# Patient Record
Sex: FEMALE | Race: BLACK OR AFRICAN AMERICAN | NOT HISPANIC OR LATINO
[De-identification: names, ages, dates, MRNs, and addresses within clinical notes are randomized per-mention and may not be internally consistent; named-entity substitution may affect disease eponyms.]

---

## 2017-01-27 ENCOUNTER — MEDICATION RENEWAL (OUTPATIENT)
Age: 37
End: 2017-01-27

## 2017-02-10 ENCOUNTER — MEDICATION RENEWAL (OUTPATIENT)
Age: 37
End: 2017-02-10

## 2017-02-15 ENCOUNTER — MEDICATION RENEWAL (OUTPATIENT)
Age: 37
End: 2017-02-15

## 2017-02-23 ENCOUNTER — APPOINTMENT (OUTPATIENT)
Dept: INTERNAL MEDICINE | Facility: CLINIC | Age: 37
End: 2017-02-23

## 2017-02-23 VITALS
DIASTOLIC BLOOD PRESSURE: 60 MMHG | SYSTOLIC BLOOD PRESSURE: 100 MMHG | WEIGHT: 148 LBS | BODY MASS INDEX: 24.66 KG/M2 | TEMPERATURE: 98.3 F | HEIGHT: 65 IN | HEART RATE: 76 BPM

## 2017-02-23 LAB — HBA1C MFR BLD HPLC: 6.3

## 2017-02-24 LAB
ALBUMIN SERPL ELPH-MCNC: 4.1 G/DL
ALP BLD-CCNC: 71 U/L
ALT SERPL-CCNC: 13 U/L
AST SERPL-CCNC: 15 U/L
BILIRUB DIRECT SERPL-MCNC: 0.1 MG/DL
BILIRUB INDIRECT SERPL-MCNC: 0.3 MG/DL
BILIRUB SERPL-MCNC: 0.5 MG/DL
CREAT SPEC-SCNC: 28 MG/DL
MICROALBUMIN 24H UR DL<=1MG/L-MCNC: <0.3 MG/DL
MICROALBUMIN/CREAT 24H UR-RTO: NORMAL UG/MG
PROT SERPL-MCNC: 7 G/DL

## 2017-05-22 ENCOUNTER — RX RENEWAL (OUTPATIENT)
Age: 37
End: 2017-05-22

## 2017-05-25 ENCOUNTER — APPOINTMENT (OUTPATIENT)
Dept: INTERNAL MEDICINE | Facility: CLINIC | Age: 37
End: 2017-05-25

## 2017-05-25 ENCOUNTER — MEDICATION RENEWAL (OUTPATIENT)
Age: 37
End: 2017-05-25

## 2017-05-25 VITALS
OXYGEN SATURATION: 98 % | HEIGHT: 65 IN | BODY MASS INDEX: 23.99 KG/M2 | WEIGHT: 144 LBS | TEMPERATURE: 98.1 F | DIASTOLIC BLOOD PRESSURE: 68 MMHG | SYSTOLIC BLOOD PRESSURE: 114 MMHG | HEART RATE: 74 BPM

## 2017-05-25 DIAGNOSIS — E55.9 VITAMIN D DEFICIENCY, UNSPECIFIED: ICD-10-CM

## 2017-05-25 LAB — HBA1C MFR BLD HPLC: 6.2

## 2017-05-26 LAB
ALBUMIN SERPL ELPH-MCNC: 4 G/DL
ALP BLD-CCNC: 53 U/L
ALT SERPL-CCNC: 12 U/L
AST SERPL-CCNC: 15 U/L
BILIRUB DIRECT SERPL-MCNC: 0.1 MG/DL
BILIRUB INDIRECT SERPL-MCNC: 0.4 MG/DL
BILIRUB SERPL-MCNC: 0.4 MG/DL
PROT SERPL-MCNC: 6.9 G/DL

## 2017-09-07 ENCOUNTER — APPOINTMENT (OUTPATIENT)
Dept: INTERNAL MEDICINE | Facility: CLINIC | Age: 37
End: 2017-09-07
Payer: COMMERCIAL

## 2017-09-07 VITALS
BODY MASS INDEX: 25.33 KG/M2 | WEIGHT: 152 LBS | HEIGHT: 65 IN | DIASTOLIC BLOOD PRESSURE: 77 MMHG | HEART RATE: 71 BPM | SYSTOLIC BLOOD PRESSURE: 115 MMHG | OXYGEN SATURATION: 98 % | TEMPERATURE: 98.5 F

## 2017-09-07 LAB — HBA1C MFR BLD HPLC: 6.9

## 2017-09-07 PROCEDURE — 99395 PREV VISIT EST AGE 18-39: CPT | Mod: 25

## 2017-09-07 PROCEDURE — 83036 HEMOGLOBIN GLYCOSYLATED A1C: CPT | Mod: QW

## 2017-09-07 PROCEDURE — 36415 COLL VENOUS BLD VENIPUNCTURE: CPT

## 2017-09-08 LAB
ALBUMIN SERPL ELPH-MCNC: 4.4 G/DL
ALP BLD-CCNC: 65 U/L
ALT SERPL-CCNC: 11 U/L
ANION GAP SERPL CALC-SCNC: 18 MMOL/L
APPEARANCE: CLEAR
AST SERPL-CCNC: 21 U/L
BASOPHILS # BLD AUTO: 0.02 K/UL
BASOPHILS NFR BLD AUTO: 0.2 %
BILIRUB SERPL-MCNC: 0.4 MG/DL
BILIRUBIN URINE: NEGATIVE
BLOOD URINE: NEGATIVE
BUN SERPL-MCNC: 10 MG/DL
CALCIUM SERPL-MCNC: 9.5 MG/DL
CHLORIDE SERPL-SCNC: 102 MMOL/L
CHOLEST SERPL-MCNC: 194 MG/DL
CHOLEST/HDLC SERPL: 2.7 RATIO
CO2 SERPL-SCNC: 21 MMOL/L
COLOR: YELLOW
CREAT SERPL-MCNC: 0.59 MG/DL
EOSINOPHIL # BLD AUTO: 0.06 K/UL
EOSINOPHIL NFR BLD AUTO: 0.7 %
GLUCOSE QUALITATIVE U: NORMAL MG/DL
GLUCOSE SERPL-MCNC: 89 MG/DL
HCT VFR BLD CALC: 43.2 %
HDLC SERPL-MCNC: 73 MG/DL
HGB BLD-MCNC: 14 G/DL
IMM GRANULOCYTES NFR BLD AUTO: 0.1 %
KETONES URINE: NEGATIVE
LDLC SERPL CALC-MCNC: 97 MG/DL
LEUKOCYTE ESTERASE URINE: NEGATIVE
LYMPHOCYTES # BLD AUTO: 4.49 K/UL
LYMPHOCYTES NFR BLD AUTO: 49.4 %
MAN DIFF?: NORMAL
MCHC RBC-ENTMCNC: 28.3 PG
MCHC RBC-ENTMCNC: 32.4 GM/DL
MCV RBC AUTO: 87.4 FL
MONOCYTES # BLD AUTO: 0.59 K/UL
MONOCYTES NFR BLD AUTO: 6.5 %
NEUTROPHILS # BLD AUTO: 3.91 K/UL
NEUTROPHILS NFR BLD AUTO: 43.1 %
NITRITE URINE: NEGATIVE
PH URINE: 5.5
PLATELET # BLD AUTO: 255 K/UL
POTASSIUM SERPL-SCNC: 4.5 MMOL/L
PROT SERPL-MCNC: 7.8 G/DL
PROTEIN URINE: NEGATIVE MG/DL
RBC # BLD: 4.94 M/UL
RBC # FLD: 13.4 %
SODIUM SERPL-SCNC: 141 MMOL/L
SPECIFIC GRAVITY URINE: 1.01
TRIGL SERPL-MCNC: 119 MG/DL
TSH SERPL-ACNC: 1.84 UIU/ML
UROBILINOGEN URINE: NORMAL MG/DL
VIT B12 SERPL-MCNC: 567 PG/ML
WBC # FLD AUTO: 9.08 K/UL

## 2017-09-09 LAB — 25(OH)D3 SERPL-MCNC: 32.8 NG/ML

## 2017-09-28 ENCOUNTER — APPOINTMENT (OUTPATIENT)
Dept: ULTRASOUND IMAGING | Facility: CLINIC | Age: 37
End: 2017-09-28
Payer: COMMERCIAL

## 2017-09-28 ENCOUNTER — OUTPATIENT (OUTPATIENT)
Dept: OUTPATIENT SERVICES | Facility: HOSPITAL | Age: 37
LOS: 1 days | End: 2017-09-28

## 2017-09-28 DIAGNOSIS — Z00.8 ENCOUNTER FOR OTHER GENERAL EXAMINATION: ICD-10-CM

## 2017-09-28 PROCEDURE — 76830 TRANSVAGINAL US NON-OB: CPT | Mod: 26

## 2017-09-28 PROCEDURE — 76856 US EXAM PELVIC COMPLETE: CPT | Mod: 26,59

## 2017-10-12 ENCOUNTER — MEDICATION RENEWAL (OUTPATIENT)
Age: 37
End: 2017-10-12

## 2017-10-13 LAB
HBV SURFACE AB SER QL: NONREACTIVE
HBV SURFACE AB SERPL IA-ACNC: <3 MIU/ML
HBV SURFACE AG SER QL: NONREACTIVE
MEV IGG FLD QL IA: 80.9 AU/ML
MEV IGG+IGM SER-IMP: POSITIVE
MUV AB SER-ACNC: POSITIVE
MUV IGG SER QL IA: 72.1 AU/ML
RUBV IGG FLD-ACNC: 25.7 INDEX
RUBV IGG SER-IMP: POSITIVE
VZV AB TITR SER: POSITIVE
VZV IGG SER IF-ACNC: 3188 INDEX

## 2017-10-16 LAB
ADJUSTED MITOGEN: >10 IU/ML
ADJUSTED TB AG: >10 IU/ML
AMPHETAMINES, SERUM: NEGATIVE
BARBITUATES, SERUM: NEGATIVE
BENZODIAZEPINES, SERUM: NEGATIVE
CANNABINOIDS, SERUM: NEGATIVE
COCAINE METABOLITES, SERUM: NEGATIVE
M TB IFN-G BLD-IMP: POSITIVE
METHADONE, SERUM: NEGATIVE
OPIATES, SERUM: NEGATIVE
PHENCYCLIDINE, SERUM: NEGATIVE
QUANTIFERON GOLD NIL: 0.3 IU/ML

## 2017-10-17 ENCOUNTER — APPOINTMENT (OUTPATIENT)
Dept: INTERNAL MEDICINE | Facility: CLINIC | Age: 37
End: 2017-10-17
Payer: COMMERCIAL

## 2017-10-17 ENCOUNTER — MED ADMIN CHARGE (OUTPATIENT)
Age: 37
End: 2017-10-17

## 2017-10-17 PROCEDURE — 90746 HEPB VACCINE 3 DOSE ADULT IM: CPT

## 2017-10-17 PROCEDURE — 90471 IMMUNIZATION ADMIN: CPT

## 2017-10-18 ENCOUNTER — RECORD ABSTRACTING (OUTPATIENT)
Age: 37
End: 2017-10-18

## 2017-10-19 ENCOUNTER — APPOINTMENT (OUTPATIENT)
Dept: INTERNAL MEDICINE | Facility: CLINIC | Age: 37
End: 2017-10-19
Payer: COMMERCIAL

## 2017-10-19 PROCEDURE — G0008: CPT

## 2017-10-19 PROCEDURE — 90688 IIV4 VACCINE SPLT 0.5 ML IM: CPT

## 2017-10-19 RX ORDER — LANCETS
EACH MISCELLANEOUS
Qty: 50 | Refills: 6 | Status: ACTIVE | COMMUNITY
Start: 2017-02-15 | End: 1900-01-01

## 2017-10-19 RX ORDER — DOXYCLYCLINE HYCLATE 150 MG/1
150 TABLET, COATED ORAL
Qty: 30 | Refills: 0 | Status: DISCONTINUED | COMMUNITY
Start: 2017-08-15

## 2017-10-19 RX ORDER — ADAPALENE AND BENZOYL PEROXIDE 3; 25 MG/G; MG/G
0.3-2.5 GEL TOPICAL
Qty: 45 | Refills: 0 | Status: DISCONTINUED | COMMUNITY
Start: 2017-08-15

## 2017-10-19 RX ORDER — SULFACETAMIDE SODIUM, SULFUR 100; 50 MG/G; MG/G
10-5 LIQUID TOPICAL
Qty: 227 | Refills: 0 | Status: DISCONTINUED | COMMUNITY
Start: 2017-08-15

## 2017-10-26 ENCOUNTER — APPOINTMENT (OUTPATIENT)
Dept: OBGYN | Facility: CLINIC | Age: 37
End: 2017-10-26
Payer: COMMERCIAL

## 2017-10-26 VITALS
DIASTOLIC BLOOD PRESSURE: 70 MMHG | BODY MASS INDEX: 24.25 KG/M2 | HEIGHT: 68 IN | SYSTOLIC BLOOD PRESSURE: 110 MMHG | WEIGHT: 160 LBS

## 2017-10-26 PROCEDURE — 99213 OFFICE O/P EST LOW 20 MIN: CPT

## 2017-10-31 ENCOUNTER — RECORD ABSTRACTING (OUTPATIENT)
Age: 37
End: 2017-10-31

## 2017-11-28 ENCOUNTER — RECORD ABSTRACTING (OUTPATIENT)
Age: 37
End: 2017-11-28

## 2017-11-28 DIAGNOSIS — Z92.89 PERSONAL HISTORY OF OTHER MEDICAL TREATMENT: ICD-10-CM

## 2017-11-29 ENCOUNTER — APPOINTMENT (OUTPATIENT)
Dept: OBGYN | Facility: CLINIC | Age: 37
End: 2017-11-29
Payer: COMMERCIAL

## 2017-11-29 VITALS
BODY MASS INDEX: 28.39 KG/M2 | HEIGHT: 65 IN | HEART RATE: 95 BPM | DIASTOLIC BLOOD PRESSURE: 75 MMHG | SYSTOLIC BLOOD PRESSURE: 112 MMHG | WEIGHT: 170.41 LBS

## 2017-11-29 PROCEDURE — 99212 OFFICE O/P EST SF 10 MIN: CPT

## 2017-12-27 ENCOUNTER — APPOINTMENT (OUTPATIENT)
Dept: INTERNAL MEDICINE | Facility: CLINIC | Age: 37
End: 2017-12-27
Payer: COMMERCIAL

## 2017-12-27 VITALS
TEMPERATURE: 98.2 F | WEIGHT: 168 LBS | HEIGHT: 65 IN | BODY MASS INDEX: 27.99 KG/M2 | DIASTOLIC BLOOD PRESSURE: 70 MMHG | HEART RATE: 86 BPM | SYSTOLIC BLOOD PRESSURE: 100 MMHG | OXYGEN SATURATION: 98 %

## 2017-12-27 LAB — HBA1C MFR BLD HPLC: 7

## 2017-12-27 PROCEDURE — 90746 HEPB VACCINE 3 DOSE ADULT IM: CPT

## 2017-12-27 PROCEDURE — 90471 IMMUNIZATION ADMIN: CPT

## 2017-12-27 PROCEDURE — 83036 HEMOGLOBIN GLYCOSYLATED A1C: CPT | Mod: QW

## 2017-12-27 PROCEDURE — 99214 OFFICE O/P EST MOD 30 MIN: CPT | Mod: 25

## 2017-12-27 RX ORDER — METFORMIN HYDROCHLORIDE 625 MG/1
TABLET ORAL
Refills: 0 | Status: DISCONTINUED | COMMUNITY
End: 2017-12-27

## 2018-04-17 ENCOUNTER — APPOINTMENT (OUTPATIENT)
Dept: INTERNAL MEDICINE | Facility: CLINIC | Age: 38
End: 2018-04-17

## 2018-04-30 ENCOUNTER — RX RENEWAL (OUTPATIENT)
Age: 38
End: 2018-04-30

## 2018-05-01 ENCOUNTER — APPOINTMENT (OUTPATIENT)
Dept: HUMAN REPRODUCTION | Facility: CLINIC | Age: 38
End: 2018-05-01
Payer: COMMERCIAL

## 2018-05-01 PROCEDURE — 36415 COLL VENOUS BLD VENIPUNCTURE: CPT

## 2018-05-01 PROCEDURE — 99243 OFF/OP CNSLTJ NEW/EST LOW 30: CPT | Mod: 25

## 2018-05-16 ENCOUNTER — APPOINTMENT (OUTPATIENT)
Dept: HUMAN REPRODUCTION | Facility: CLINIC | Age: 38
End: 2018-05-16
Payer: COMMERCIAL

## 2018-05-16 ENCOUNTER — TRANSCRIPTION ENCOUNTER (OUTPATIENT)
Age: 38
End: 2018-05-16

## 2018-05-16 PROCEDURE — 76830 TRANSVAGINAL US NON-OB: CPT

## 2018-05-16 PROCEDURE — 99213 OFFICE O/P EST LOW 20 MIN: CPT | Mod: 25

## 2018-05-16 PROCEDURE — 36415 COLL VENOUS BLD VENIPUNCTURE: CPT

## 2018-05-17 ENCOUNTER — APPOINTMENT (OUTPATIENT)
Dept: INTERNAL MEDICINE | Facility: CLINIC | Age: 38
End: 2018-05-17
Payer: COMMERCIAL

## 2018-05-17 VITALS
DIASTOLIC BLOOD PRESSURE: 70 MMHG | OXYGEN SATURATION: 98 % | TEMPERATURE: 97.4 F | SYSTOLIC BLOOD PRESSURE: 115 MMHG | HEART RATE: 87 BPM | HEIGHT: 65 IN | WEIGHT: 168 LBS | BODY MASS INDEX: 27.99 KG/M2

## 2018-05-17 LAB — HBA1C MFR BLD HPLC: 7.3

## 2018-05-17 PROCEDURE — 99214 OFFICE O/P EST MOD 30 MIN: CPT | Mod: 25

## 2018-05-17 PROCEDURE — 90471 IMMUNIZATION ADMIN: CPT

## 2018-05-17 PROCEDURE — 83036 HEMOGLOBIN GLYCOSYLATED A1C: CPT | Mod: QW

## 2018-05-17 PROCEDURE — 90746 HEPB VACCINE 3 DOSE ADULT IM: CPT

## 2018-05-17 RX ORDER — ADHESIVE TAPE 3"X 2.3 YD
50 MCG TAPE, NON-MEDICATED TOPICAL
Qty: 90 | Refills: 3 | Status: ACTIVE | COMMUNITY
Start: 2017-05-25 | End: 1900-01-01

## 2018-05-17 NOTE — HISTORY OF PRESENT ILLNESS
[FreeTextEntry1] : \par DM type 2 - last Aic 7.0 went up \par - taking metformin 500 BID  , exercising 2-3 days a weeks for 30 minutes, and has lost weight feels good wanted to know if she can cut her medication to once a day , now eating low carb diet ,no juices etc \par not checking sugar readings \par saw ophtho 9/2016 , will schedule apt \par \par h/o PPD + did chest xray 1981 \par -started 11/2016 INH and Pyridoxine , did CT chest results reviewed nl , will finish in July \par -Denies any symptoms of actinic keratosis. No night sweats, chronic cough, weight loss etc\par \par H/o GERD 2008 did EGD x 2 in [place] was given Rx for GASTRO- took omeprazole 40 for 1 month no symptoms now \par

## 2018-05-17 NOTE — PHYSICAL EXAM
[No Acute Distress] : no acute distress [Well Nourished] : well nourished [Well Developed] : well developed [No Respiratory Distress] : no respiratory distress  [Clear to Auscultation] : lungs were clear to auscultation bilaterally [No Accessory Muscle Use] : no accessory muscle use [Normal Rate] : normal rate  [Regular Rhythm] : with a regular rhythm [Normal S1, S2] : normal S1 and S2 [Soft] : abdomen soft [Non Tender] : non-tender [Normal Bowel Sounds] : normal bowel sounds

## 2018-05-17 NOTE — ASSESSMENT
[FreeTextEntry1] : 38 yo F with recently diagnosed DMII (A1C 11.1% 7/2016 ) presented today for follow up \par \par 1. DM \par - POC A1C today was 7.3 went up - due to non comp with medication  \par - advised to increase exercise to 30 minutes daily , do resistance training 2 x a week 20 minutes \par - refuse rice to 2 x a week , banaba to 2 x a week change potato to sweet potato  \par -no neuropathy , ophtho 9/2016 , will schedule f/u referral given \par --reenforced compliance with metformin change to to 500 po 2 x  daily after food \par - Reminded patient to maintain healthy eating habits (avoid sweets, carbs) and to continue exercising as she has been, including honey \par - Continue monitoring sugars\par - Will check ua , ophtho f/u  \par - F/u appointment in 3 months\par \par 2. Hyperlipidemia\par - self discontinued statins - refused medications \par - low fat diet reviewed \par \par 3. +PPD since 11/2016 rx \par - finished rx NH/Pyridoxine, check LFT \par \par 4. GERD\par - Patient states that it has resolved, will refill more if needed\par \par 5. Papular acne - seeing dermatologist on doxy , clinda / face wash has f/u 1 month \par received flu vaccine 8/2016\par \par non immune to hep B - received 2  dose so far  3rd dose given today \par \par HCM \par tdap - 2016\par PAP- 11 2016 \par got flu vaccine 10/2017

## 2018-05-18 LAB
CREAT SPEC-SCNC: 38 MG/DL
MICROALBUMIN 24H UR DL<=1MG/L-MCNC: 0.5 MG/DL
MICROALBUMIN/CREAT 24H UR-RTO: 13 MG/G

## 2018-07-12 ENCOUNTER — APPOINTMENT (OUTPATIENT)
Dept: OPHTHALMOLOGY | Facility: CLINIC | Age: 38
End: 2018-07-12
Payer: COMMERCIAL

## 2018-07-12 ENCOUNTER — APPOINTMENT (OUTPATIENT)
Dept: ENDOCRINOLOGY | Facility: CLINIC | Age: 38
End: 2018-07-12
Payer: COMMERCIAL

## 2018-07-12 VITALS
HEIGHT: 65 IN | WEIGHT: 171 LBS | OXYGEN SATURATION: 99 % | SYSTOLIC BLOOD PRESSURE: 110 MMHG | BODY MASS INDEX: 28.49 KG/M2 | DIASTOLIC BLOOD PRESSURE: 70 MMHG | HEART RATE: 76 BPM

## 2018-07-12 LAB
GLUCOSE BLDC GLUCOMTR-MCNC: 103
HBA1C MFR BLD HPLC: 6.9

## 2018-07-12 PROCEDURE — 83036 HEMOGLOBIN GLYCOSYLATED A1C: CPT | Mod: QW

## 2018-07-12 PROCEDURE — 92014 COMPRE OPH EXAM EST PT 1/>: CPT

## 2018-07-12 PROCEDURE — 99203 OFFICE O/P NEW LOW 30 MIN: CPT | Mod: 25

## 2018-07-12 PROCEDURE — 82962 GLUCOSE BLOOD TEST: CPT

## 2018-07-23 ENCOUNTER — RESULT REVIEW (OUTPATIENT)
Age: 38
End: 2018-07-23

## 2018-07-23 LAB
17OHP SERPL-MCNC: 126 NG/DL
25(OH)D3 SERPL-MCNC: 38.1 NG/ML
CHOLEST SERPL-MCNC: 183 MG/DL
CHOLEST/HDLC SERPL: 3.2 RATIO
DHEA-S SERPL-MCNC: 54.4 UG/DL
ESTRADIOL SERPL-MCNC: 113 PG/ML
FSH SERPL-MCNC: 8.4 IU/L
HBA1C MFR BLD HPLC: 6.8 %
HDLC SERPL-MCNC: 57 MG/DL
INSULIN P FAST SERPL-ACNC: 9.4 UU/ML
LDLC SERPL CALC-MCNC: 101 MG/DL
LH SERPL-ACNC: 16.1 IU/L
PROLACTIN SERPL-MCNC: 17 NG/ML
SHBG SERPL-SCNC: 34 NMOL/L
T4 FREE SERPL-MCNC: 1.3 NG/DL
TESTOST BND SERPL-MCNC: 0.46 NG/DL
TESTOST SERPL-MCNC: 14.1 NG/DL
TESTOSTERONE BIOAVAILABLE: 2.2 NG/DL
TESTOSTERONE TOTAL S: 17 NG/DL
THYROGLOB AB SERPL-ACNC: <20 IU/ML
THYROPEROXIDASE AB SERPL IA-ACNC: <10 IU/ML
TRIGL SERPL-MCNC: 125 MG/DL
TSH SERPL-ACNC: 1.79 UIU/ML

## 2018-07-24 LAB
MONOMERIC PROLACTIN (ICMA)*: 14 NG/ML
PERCENT MACROPROLACTIN: 0 %
PROLACTIN, SERUM (ICMA)*: 14 NG/ML

## 2018-08-23 ENCOUNTER — RX RENEWAL (OUTPATIENT)
Age: 38
End: 2018-08-23

## 2018-08-24 ENCOUNTER — APPOINTMENT (OUTPATIENT)
Dept: HUMAN REPRODUCTION | Facility: CLINIC | Age: 38
End: 2018-08-24
Payer: COMMERCIAL

## 2018-08-24 PROCEDURE — 36415 COLL VENOUS BLD VENIPUNCTURE: CPT

## 2018-08-24 PROCEDURE — 99213 OFFICE O/P EST LOW 20 MIN: CPT | Mod: 25

## 2018-08-24 PROCEDURE — 76830 TRANSVAGINAL US NON-OB: CPT

## 2018-08-30 ENCOUNTER — APPOINTMENT (OUTPATIENT)
Dept: HUMAN REPRODUCTION | Facility: CLINIC | Age: 38
End: 2018-08-30
Payer: COMMERCIAL

## 2018-08-30 PROCEDURE — 99213 OFFICE O/P EST LOW 20 MIN: CPT | Mod: 25

## 2018-08-30 PROCEDURE — 76830 TRANSVAGINAL US NON-OB: CPT

## 2018-09-01 ENCOUNTER — APPOINTMENT (OUTPATIENT)
Dept: HUMAN REPRODUCTION | Facility: CLINIC | Age: 38
End: 2018-09-01
Payer: COMMERCIAL

## 2018-09-01 PROCEDURE — 99213 OFFICE O/P EST LOW 20 MIN: CPT | Mod: 25

## 2018-09-01 PROCEDURE — 89261 SPERM ISOLATION COMPLEX: CPT

## 2018-09-01 PROCEDURE — 58322 ARTIFICIAL INSEMINATION: CPT

## 2018-09-01 PROCEDURE — 76830 TRANSVAGINAL US NON-OB: CPT

## 2018-09-04 ENCOUNTER — FORM ENCOUNTER (OUTPATIENT)
Age: 38
End: 2018-09-04

## 2018-09-13 ENCOUNTER — APPOINTMENT (OUTPATIENT)
Dept: INTERNAL MEDICINE | Facility: CLINIC | Age: 38
End: 2018-09-13
Payer: COMMERCIAL

## 2018-09-13 VITALS
OXYGEN SATURATION: 98 % | DIASTOLIC BLOOD PRESSURE: 78 MMHG | TEMPERATURE: 98 F | HEIGHT: 65 IN | WEIGHT: 150 LBS | HEART RATE: 96 BPM | BODY MASS INDEX: 24.99 KG/M2 | SYSTOLIC BLOOD PRESSURE: 110 MMHG

## 2018-09-13 PROCEDURE — G0008: CPT

## 2018-09-13 PROCEDURE — 90686 IIV4 VACC NO PRSV 0.5 ML IM: CPT

## 2018-09-13 PROCEDURE — 36415 COLL VENOUS BLD VENIPUNCTURE: CPT

## 2018-09-13 PROCEDURE — 83036 HEMOGLOBIN GLYCOSYLATED A1C: CPT | Mod: QW

## 2018-09-13 PROCEDURE — 99214 OFFICE O/P EST MOD 30 MIN: CPT | Mod: 25

## 2018-09-13 NOTE — HISTORY OF PRESENT ILLNESS
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 \par \par DM type 2 - saw endo AIc 7/2018 was 6.8 went up on metformin 500 TID  \par - lost 20 lbs \par - exercising 2-3 days a weeks for 30 minutes, and has lost weight feels good wanted to know if she can cut her medication to once a day , now eating low carb diet ,no juices etc \par not checking sugar readings \par saw ophtho 7/2018 all ok no retinopathy  \par \par also seeing fertility doctor to get IVF -rxs \par \par h/o PPD + did chest xray 1981 \par -started 11/2016 INH and Pyridoxine , did CT chest results reviewed nl , will finish in July \par -Denies any symptoms of actinic keratosis. No night sweats, chronic cough, weight loss etc\par \par H/o GERD 2008 did EGD x 2 in [place] was given Rx for GASTRO- took omeprazole 40 for 1 month no symptoms now \par

## 2018-09-13 NOTE — ASSESSMENT
[FreeTextEntry1] : 38 yo F with recently diagnosed DMII (A1C 11.1% 7/2016 ) presented today for follow up \par \par 1. DM \par - POC A1C today was- 4.4 will get venous draw to confirm \par - advised to increase exercise to 30 minutes daily , do resistance training 2 x a week 20 minutes \par -no neuropathy , ophtho 7/2018  \par --reenforced compliance with metformin change to to 500 po 2 x  daily after food if true value \par - Reminded patient to maintain healthy eating habits (avoid sweets, carbs) and to continue exercising as she has been, including honey \par - Continue monitoring sugars\par - F/u appointment in 3 months\par \par 2. Hyperlipidemia\par - self discontinued statins - refused medications \par - low fat diet reviewed \par \par 3. +PPD since 11/2016 rx \par - finished rx [facility]/Pyridoxine, check LFT \par \par 4. GERD\par - Patient states that it has resolved, will refill more if needed\par \par 5. Papular acne - seeing dermatologist on doxy , clinda / face wash has f/u 1 month \par received flu vaccine 8/2016\par \par forms filled - urine tox ordered \par \par HCM \par tdap - 2016\par PAP- 2018 as per pt \par flu vaccine given today

## 2018-09-14 ENCOUNTER — APPOINTMENT (OUTPATIENT)
Dept: HUMAN REPRODUCTION | Facility: CLINIC | Age: 38
End: 2018-09-14
Payer: COMMERCIAL

## 2018-09-14 LAB
CREAT SPEC-SCNC: 171 MG/DL
HBA1C MFR BLD HPLC: 4.4
HBA1C MFR BLD HPLC: 6.3 %
HBV SURFACE AB SER QL: REACTIVE
HBV SURFACE AB SERPL IA-ACNC: >1000 MIU/ML
MICROALBUMIN 24H UR DL<=1MG/L-MCNC: <1.2 MG/DL
MICROALBUMIN/CREAT 24H UR-RTO: NORMAL

## 2018-09-14 PROCEDURE — 99213 OFFICE O/P EST LOW 20 MIN: CPT

## 2018-09-14 PROCEDURE — 36415 COLL VENOUS BLD VENIPUNCTURE: CPT

## 2018-09-17 LAB — DRUG ABUSE PANEL-9, SERUM: NORMAL

## 2018-09-18 ENCOUNTER — APPOINTMENT (OUTPATIENT)
Dept: HUMAN REPRODUCTION | Facility: CLINIC | Age: 38
End: 2018-09-18
Payer: COMMERCIAL

## 2018-09-18 PROCEDURE — 99213 OFFICE O/P EST LOW 20 MIN: CPT | Mod: 25

## 2018-09-18 PROCEDURE — 36415 COLL VENOUS BLD VENIPUNCTURE: CPT

## 2018-09-18 PROCEDURE — 76830 TRANSVAGINAL US NON-OB: CPT

## 2018-09-25 ENCOUNTER — APPOINTMENT (OUTPATIENT)
Dept: HUMAN REPRODUCTION | Facility: CLINIC | Age: 38
End: 2018-09-25
Payer: COMMERCIAL

## 2018-09-25 ENCOUNTER — APPOINTMENT (OUTPATIENT)
Dept: HUMAN REPRODUCTION | Facility: CLINIC | Age: 38
End: 2018-09-25

## 2018-09-25 PROCEDURE — 89261 SPERM ISOLATION COMPLEX: CPT

## 2018-09-25 PROCEDURE — 99213 OFFICE O/P EST LOW 20 MIN: CPT | Mod: 25

## 2018-09-25 PROCEDURE — 58322 ARTIFICIAL INSEMINATION: CPT

## 2018-10-09 ENCOUNTER — APPOINTMENT (OUTPATIENT)
Dept: HUMAN REPRODUCTION | Facility: CLINIC | Age: 38
End: 2018-10-09
Payer: COMMERCIAL

## 2018-10-09 PROCEDURE — 36415 COLL VENOUS BLD VENIPUNCTURE: CPT

## 2018-10-09 PROCEDURE — 99211 OFF/OP EST MAY X REQ PHY/QHP: CPT | Mod: 25

## 2018-10-12 ENCOUNTER — APPOINTMENT (OUTPATIENT)
Dept: HUMAN REPRODUCTION | Facility: CLINIC | Age: 38
End: 2018-10-12
Payer: COMMERCIAL

## 2018-10-12 PROCEDURE — 76830 TRANSVAGINAL US NON-OB: CPT

## 2018-10-12 PROCEDURE — 99213 OFFICE O/P EST LOW 20 MIN: CPT | Mod: 25

## 2018-10-12 PROCEDURE — 36415 COLL VENOUS BLD VENIPUNCTURE: CPT

## 2018-10-19 ENCOUNTER — APPOINTMENT (OUTPATIENT)
Dept: HUMAN REPRODUCTION | Facility: CLINIC | Age: 38
End: 2018-10-19
Payer: COMMERCIAL

## 2018-10-19 PROCEDURE — 76830 TRANSVAGINAL US NON-OB: CPT

## 2018-10-19 PROCEDURE — 99213 OFFICE O/P EST LOW 20 MIN: CPT | Mod: 25

## 2018-10-19 PROCEDURE — 36415 COLL VENOUS BLD VENIPUNCTURE: CPT

## 2018-10-21 ENCOUNTER — APPOINTMENT (OUTPATIENT)
Dept: HUMAN REPRODUCTION | Facility: CLINIC | Age: 38
End: 2018-10-21
Payer: COMMERCIAL

## 2018-10-21 PROCEDURE — 99213 OFFICE O/P EST LOW 20 MIN: CPT | Mod: 25

## 2018-10-21 PROCEDURE — 58322 ARTIFICIAL INSEMINATION: CPT

## 2018-10-21 PROCEDURE — 89261 SPERM ISOLATION COMPLEX: CPT

## 2018-10-21 PROCEDURE — 76830 TRANSVAGINAL US NON-OB: CPT

## 2018-10-23 ENCOUNTER — MEDICATION RENEWAL (OUTPATIENT)
Age: 38
End: 2018-10-23

## 2018-10-29 ENCOUNTER — RX RENEWAL (OUTPATIENT)
Age: 38
End: 2018-10-29

## 2018-11-06 ENCOUNTER — APPOINTMENT (OUTPATIENT)
Dept: HUMAN REPRODUCTION | Facility: CLINIC | Age: 38
End: 2018-11-06
Payer: COMMERCIAL

## 2018-11-06 PROCEDURE — 99213 OFFICE O/P EST LOW 20 MIN: CPT | Mod: 25

## 2018-11-06 PROCEDURE — 76830 TRANSVAGINAL US NON-OB: CPT

## 2018-11-06 PROCEDURE — 36415 COLL VENOUS BLD VENIPUNCTURE: CPT

## 2018-11-14 ENCOUNTER — APPOINTMENT (OUTPATIENT)
Dept: HUMAN REPRODUCTION | Facility: CLINIC | Age: 38
End: 2018-11-14
Payer: COMMERCIAL

## 2018-11-14 PROCEDURE — 99213 OFFICE O/P EST LOW 20 MIN: CPT | Mod: 25

## 2018-11-14 PROCEDURE — 76830 TRANSVAGINAL US NON-OB: CPT

## 2018-11-16 ENCOUNTER — APPOINTMENT (OUTPATIENT)
Dept: HUMAN REPRODUCTION | Facility: CLINIC | Age: 38
End: 2018-11-16
Payer: COMMERCIAL

## 2018-11-16 PROCEDURE — 89261 SPERM ISOLATION COMPLEX: CPT

## 2018-11-16 PROCEDURE — 58323 SPERM WASHING: CPT

## 2018-11-16 PROCEDURE — 99213 OFFICE O/P EST LOW 20 MIN: CPT | Mod: 25

## 2018-11-16 PROCEDURE — 58322 ARTIFICIAL INSEMINATION: CPT

## 2018-11-19 ENCOUNTER — MEDICATION RENEWAL (OUTPATIENT)
Age: 38
End: 2018-11-19

## 2018-11-30 ENCOUNTER — APPOINTMENT (OUTPATIENT)
Dept: HUMAN REPRODUCTION | Facility: CLINIC | Age: 38
End: 2018-11-30

## 2018-12-05 ENCOUNTER — APPOINTMENT (OUTPATIENT)
Dept: HUMAN REPRODUCTION | Facility: CLINIC | Age: 38
End: 2018-12-05
Payer: COMMERCIAL

## 2018-12-05 PROCEDURE — 36415 COLL VENOUS BLD VENIPUNCTURE: CPT

## 2018-12-05 PROCEDURE — 99213 OFFICE O/P EST LOW 20 MIN: CPT | Mod: 25

## 2018-12-05 PROCEDURE — 76830 TRANSVAGINAL US NON-OB: CPT

## 2018-12-11 ENCOUNTER — APPOINTMENT (OUTPATIENT)
Dept: HUMAN REPRODUCTION | Facility: CLINIC | Age: 38
End: 2018-12-11
Payer: COMMERCIAL

## 2018-12-11 PROCEDURE — 36415 COLL VENOUS BLD VENIPUNCTURE: CPT

## 2018-12-11 PROCEDURE — 76830 TRANSVAGINAL US NON-OB: CPT

## 2018-12-11 PROCEDURE — 99213 OFFICE O/P EST LOW 20 MIN: CPT | Mod: 25

## 2018-12-14 ENCOUNTER — APPOINTMENT (OUTPATIENT)
Dept: HUMAN REPRODUCTION | Facility: CLINIC | Age: 38
End: 2018-12-14

## 2018-12-16 ENCOUNTER — APPOINTMENT (OUTPATIENT)
Dept: HUMAN REPRODUCTION | Facility: CLINIC | Age: 38
End: 2018-12-16
Payer: COMMERCIAL

## 2018-12-16 PROCEDURE — 99213 OFFICE O/P EST LOW 20 MIN: CPT | Mod: 25

## 2018-12-16 PROCEDURE — 76830 TRANSVAGINAL US NON-OB: CPT

## 2018-12-16 PROCEDURE — 36415 COLL VENOUS BLD VENIPUNCTURE: CPT

## 2018-12-28 ENCOUNTER — MEDICATION RENEWAL (OUTPATIENT)
Age: 38
End: 2018-12-28

## 2019-01-16 ENCOUNTER — APPOINTMENT (OUTPATIENT)
Dept: HUMAN REPRODUCTION | Facility: CLINIC | Age: 39
End: 2019-01-16
Payer: COMMERCIAL

## 2019-01-16 PROCEDURE — 99215 OFFICE O/P EST HI 40 MIN: CPT

## 2019-02-14 ENCOUNTER — NON-APPOINTMENT (OUTPATIENT)
Age: 39
End: 2019-02-14

## 2019-02-14 ENCOUNTER — APPOINTMENT (OUTPATIENT)
Dept: INTERNAL MEDICINE | Facility: CLINIC | Age: 39
End: 2019-02-14
Payer: COMMERCIAL

## 2019-02-14 VITALS
TEMPERATURE: 98.5 F | BODY MASS INDEX: 26.99 KG/M2 | OXYGEN SATURATION: 98 % | WEIGHT: 162 LBS | DIASTOLIC BLOOD PRESSURE: 68 MMHG | HEIGHT: 65 IN | HEART RATE: 91 BPM | SYSTOLIC BLOOD PRESSURE: 126 MMHG

## 2019-02-14 PROCEDURE — 93000 ELECTROCARDIOGRAM COMPLETE: CPT

## 2019-02-14 PROCEDURE — 83036 HEMOGLOBIN GLYCOSYLATED A1C: CPT | Mod: QW

## 2019-02-14 PROCEDURE — 99214 OFFICE O/P EST MOD 30 MIN: CPT | Mod: 25

## 2019-02-14 PROCEDURE — 99395 PREV VISIT EST AGE 18-39: CPT | Mod: 25

## 2019-02-14 NOTE — HEALTH RISK ASSESSMENT
[Good] : ~his/her~  mood as  good [No falls in past year] : Patient reported no falls in the past year Awake [0] : 2) Feeling down, depressed, or hopeless: Not at all (0) [Patient reported PAP Smear was normal] : Patient reported PAP Smear was normal [None] : None [With Significant Other] : lives with significant other [Employed] : employed [] :  [Fully functional (bathing, dressing, toileting, transferring, walking, feeding)] : Fully functional (bathing, dressing, toileting, transferring, walking, feeding) [Fully functional (using the telephone, shopping, preparing meals, housekeeping, doing laundry, using] : Fully functional and needs no help or supervision to perform IADLs (using the telephone, shopping, preparing meals, housekeeping, doing laundry, using transportation, managing medications and managing finances) [] : No [[id number]] : 0 [Reports changes in hearing] : Reports no changes in hearing [Reports changes in vision] : Reports no changes in vision [Reports changes in dental health] : Reports no changes in dental health [PapSmearDate] : 2018  [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

## 2019-02-14 NOTE — HISTORY OF PRESENT ILLNESS
[FreeTextEntry1] : \par now working as a  HHA \par \par c/o internittent palpitation , no CP , SOB or dizzy spells, started 2 weeks ago \par \par DM type 2 - saw endo AIc 7/2018 on metformin 500 1 am amd 2 pm \par - lost 20 lbs in 9/2018 now back 12 lbs \par - exercising 2-3 days a weeks for 30 minutes, and has lost weight feels good wanted to know if she can cut her medication to once a day , now eating low carb diet ,no juices etc \par not checking sugar readings \par saw ophtho 7/2018 all ok no retinopathy  \par \par also seeing fertility doctor to get IVF -rxs \par \par h/o PPD + did chest xray 1981 \par -started 11/2016 INH and Pyridoxine , did CT chest results reviewed nl , will finish in July \par -Denies any symptoms of actinic keratosis. No night sweats, chronic cough, weight loss etc\par \par H/o GERD 2008 did EGD x 2 in [place] was given Rx for GASTRO- took omeprazole 40 for 1 month no symptoms now \par

## 2019-02-14 NOTE — ASSESSMENT
[FreeTextEntry1] : 37 yo F with recently diagnosed DMII (A1C 11.1% 7/2016 ) presented today for Annual check up \par \par intermittent palpitations - \par EKG - NSR no acute st changes \par -referred to see cardio r/o ACS \par -GEt TSH CMP \par -get TSH , CBC, CMP , Mg \par - educated to cut down on caffeine , tea , soda , chocolates etc \par -educated pt if palpitation reoccurs or Cp , left shoulder pain to go to ER or Call 911\par \par  DM \par - POC A1C today was- 6.4\par - advised to increase exercise to 30 minutes daily , do resistance training 2 x a week 20 minutes \par -no neuropathy , ophtho 7/2018  \par --reenforced compliance with metformin change to to 500 po 2 x  daily after food if true value \par - Reminded patient to maintain healthy eating habits (avoid sweets, carbs) and to continue exercising as she has been, including honey \par - Continue monitoring sugars\par - F/u appointment in 3 months\par \par  Hyperlipidemia\par - self discontinued statins - refused medications \par - low fat diet reviewed \par \par h/o +PPD since 11/2016 rx \par - finished rx NH/Pyridoxine, check LFT \par \par  GERD\par - Patient states that it has resolved, will refill more if needed\par \par Papular acne - seeing dermatologist on doxy , clinda / face wash \par \par \par HCM \par tdap - 2016\par PAP- 2018 as per pt \par flu vaccine given 9/2018

## 2019-02-19 LAB
25(OH)D3 SERPL-MCNC: 32.8 NG/ML
ALBUMIN SERPL ELPH-MCNC: 4.3 G/DL
ALP BLD-CCNC: 51 U/L
ALT SERPL-CCNC: 22 U/L
ANION GAP SERPL CALC-SCNC: 12 MMOL/L
APPEARANCE: CLEAR
AST SERPL-CCNC: 22 U/L
BASOPHILS # BLD AUTO: 0.02 K/UL
BASOPHILS NFR BLD AUTO: 0.2 %
BILIRUB SERPL-MCNC: 0.4 MG/DL
BILIRUBIN URINE: NEGATIVE
BLOOD URINE: NEGATIVE
BUN SERPL-MCNC: 9 MG/DL
CALCIUM SERPL-MCNC: 9.5 MG/DL
CHLORIDE SERPL-SCNC: 104 MMOL/L
CHOLEST SERPL-MCNC: 178 MG/DL
CHOLEST/HDLC SERPL: 3.4 RATIO
CO2 SERPL-SCNC: 24 MMOL/L
COLOR: YELLOW
CREAT SERPL-MCNC: 0.69 MG/DL
EOSINOPHIL # BLD AUTO: 0.07 K/UL
EOSINOPHIL NFR BLD AUTO: 0.8 %
GLUCOSE QUALITATIVE U: NEGATIVE MG/DL
GLUCOSE SERPL-MCNC: 95 MG/DL
HBA1C MFR BLD HPLC: 6.6 %
HCT VFR BLD CALC: 41.3 %
HDLC SERPL-MCNC: 53 MG/DL
HGB BLD-MCNC: 13.1 G/DL
IMM GRANULOCYTES NFR BLD AUTO: 0.3 %
KETONES URINE: ABNORMAL
LDLC SERPL CALC-MCNC: 107 MG/DL
LEUKOCYTE ESTERASE URINE: NEGATIVE
LYMPHOCYTES # BLD AUTO: 3.42 K/UL
LYMPHOCYTES NFR BLD AUTO: 38.6 %
MAN DIFF?: NORMAL
MCHC RBC-ENTMCNC: 28.4 PG
MCHC RBC-ENTMCNC: 31.7 GM/DL
MCV RBC AUTO: 89.6 FL
MONOCYTES # BLD AUTO: 0.65 K/UL
MONOCYTES NFR BLD AUTO: 7.3 %
NEUTROPHILS # BLD AUTO: 4.68 K/UL
NEUTROPHILS NFR BLD AUTO: 52.8 %
NITRITE URINE: NEGATIVE
PH URINE: 5
PLATELET # BLD AUTO: 358 K/UL
POTASSIUM SERPL-SCNC: 3.8 MMOL/L
PROT SERPL-MCNC: 7 G/DL
PROTEIN URINE: NEGATIVE MG/DL
RBC # BLD: 4.61 M/UL
RBC # FLD: 14.2 %
SODIUM SERPL-SCNC: 140 MMOL/L
SPECIFIC GRAVITY URINE: 1.02
TRIGL SERPL-MCNC: 88 MG/DL
TSH SERPL-ACNC: 1.52 UIU/ML
UROBILINOGEN URINE: NEGATIVE MG/DL
VIT B12 SERPL-MCNC: 362 PG/ML
WBC # FLD AUTO: 8.87 K/UL

## 2019-02-26 ENCOUNTER — MEDICATION RENEWAL (OUTPATIENT)
Age: 39
End: 2019-02-26

## 2019-04-24 ENCOUNTER — APPOINTMENT (OUTPATIENT)
Dept: INTERNAL MEDICINE | Facility: CLINIC | Age: 39
End: 2019-04-24

## 2019-05-07 ENCOUNTER — APPOINTMENT (OUTPATIENT)
Dept: HUMAN REPRODUCTION | Facility: CLINIC | Age: 39
End: 2019-05-07
Payer: COMMERCIAL

## 2019-05-07 PROCEDURE — 99213 OFFICE O/P EST LOW 20 MIN: CPT | Mod: 25

## 2019-05-07 PROCEDURE — 76830 TRANSVAGINAL US NON-OB: CPT

## 2019-05-07 PROCEDURE — 36415 COLL VENOUS BLD VENIPUNCTURE: CPT

## 2019-05-09 ENCOUNTER — NON-APPOINTMENT (OUTPATIENT)
Age: 39
End: 2019-05-09

## 2019-05-09 ENCOUNTER — APPOINTMENT (OUTPATIENT)
Dept: CARDIOLOGY | Facility: CLINIC | Age: 39
End: 2019-05-09
Payer: COMMERCIAL

## 2019-05-09 VITALS
BODY MASS INDEX: 26.99 KG/M2 | SYSTOLIC BLOOD PRESSURE: 120 MMHG | HEIGHT: 65 IN | HEART RATE: 84 BPM | WEIGHT: 162 LBS | DIASTOLIC BLOOD PRESSURE: 70 MMHG | OXYGEN SATURATION: 98 %

## 2019-05-09 PROCEDURE — 93000 ELECTROCARDIOGRAM COMPLETE: CPT

## 2019-05-09 PROCEDURE — 99203 OFFICE O/P NEW LOW 30 MIN: CPT

## 2019-05-09 NOTE — PHYSICAL EXAM
[General Appearance - Well Developed] : well developed [Normal Appearance] : normal appearance [Well Groomed] : well groomed [No Deformities] : no deformities [General Appearance - Well Nourished] : well nourished [No Oral Pallor] : no oral pallor [General Appearance - In No Acute Distress] : no acute distress [Normal Oral Mucosa] : normal oral mucosa [No Oral Cyanosis] : no oral cyanosis [Normal Jugular Venous V Waves Present] : normal jugular venous V waves present [Normal Jugular Venous A Waves Present] : normal jugular venous A waves present [Heart Rate And Rhythm] : heart rate and rhythm were normal [No Jugular Venous Tavares A Waves] : no jugular venous tavares A waves [Heart Sounds] : normal S1 and S2 [Murmurs] : no murmurs present [Respiration, Rhythm And Depth] : normal respiratory rhythm and effort [Exaggerated Use Of Accessory Muscles For Inspiration] : no accessory muscle use [Auscultation Breath Sounds / Voice Sounds] : lungs were clear to auscultation bilaterally [Abdomen Tenderness] : non-tender [] : no hepato-splenomegaly [Abdomen Soft] : soft [Abnormal Walk] : normal gait [Abdomen Mass (___ Cm)] : no abdominal mass palpated [Gait - Sufficient For Exercise Testing] : the gait was sufficient for exercise testing

## 2019-05-09 NOTE — HISTORY OF PRESENT ILLNESS
[FreeTextEntry1] : 38 year old woman with history of DM who had 1 episode palpitations after drinking 2-3 cups of coffee that day. No chest pain, dyspnea.\par Stopped coffee, no palpitations.

## 2019-05-09 NOTE — DISCUSSION/SUMMARY
[FreeTextEntry1] : 38 year old woman with palpitations\par Resolved after stopping caffeine\par Will monitor\par FU prn\par

## 2019-05-10 ENCOUNTER — APPOINTMENT (OUTPATIENT)
Dept: ENDOCRINOLOGY | Facility: CLINIC | Age: 39
End: 2019-05-10
Payer: COMMERCIAL

## 2019-05-10 VITALS
BODY MASS INDEX: 28.32 KG/M2 | DIASTOLIC BLOOD PRESSURE: 70 MMHG | SYSTOLIC BLOOD PRESSURE: 110 MMHG | WEIGHT: 170 LBS | HEART RATE: 84 BPM | HEIGHT: 65 IN | OXYGEN SATURATION: 98 %

## 2019-05-10 LAB
GLUCOSE BLDC GLUCOMTR-MCNC: 159
HBA1C MFR BLD HPLC: 6.9

## 2019-05-10 PROCEDURE — 83036 HEMOGLOBIN GLYCOSYLATED A1C: CPT | Mod: QW

## 2019-05-10 PROCEDURE — 99214 OFFICE O/P EST MOD 30 MIN: CPT | Mod: 25

## 2019-05-10 PROCEDURE — 36415 COLL VENOUS BLD VENIPUNCTURE: CPT

## 2019-05-10 PROCEDURE — 82962 GLUCOSE BLOOD TEST: CPT

## 2019-05-13 NOTE — ASSESSMENT
[FreeTextEntry1] : 39 yo F with PMH infertility, PCOS, DM2, HLD latent TB post INH, currently attempting pregnancy\par \par 1. DM2 - Will increase metformin to 500 mg 2 tabs in the AM and 2 tabs in the PM. Refer to nutritionist. Will mail in glucose log in 2 weeks. Will not start statin due to desired pregnancy \par \par 2. Infertility/PCOS -  Increase metformin as above. No PCOM on US. post clomid in December. Attempting IVF.\par \par \par

## 2019-05-13 NOTE — HISTORY OF PRESENT ILLNESS
[FreeTextEntry1] : 39 yo F with PMH infertility, PCOS, DM2, HLD latent TB post INH\par \par DM2\par Dx 7/2016\par Denies neuropathy\par on metformin 500 1 tab q am and 2 tabs q pm \par ophthalmologist visit 7/12/18\par Has never seen a podiatrist\par B 2 pieces of WW bread + egg// oatmeal and 1/2 banana 1 cup milk\par L: 1/4 rice + veg + meat\par D: bread + oatmeal \par F: 110-155\par >> \par \par Infertility\par She has been attempting pregnancy for > 2 years\par at initial evaluation she reports cycles had been q 26-29 days\par admits to acne\par denies hirsutism. denies excessive hair loss\par pelvic US 8/25/16 did not reveal PCOM\par normal hysterosalpingogram 2017\par Pending IVF\par menarche at 12 years old\par GYN: Bennett Woody

## 2019-05-16 ENCOUNTER — APPOINTMENT (OUTPATIENT)
Dept: INTERNAL MEDICINE | Facility: CLINIC | Age: 39
End: 2019-05-16
Payer: COMMERCIAL

## 2019-05-16 ENCOUNTER — APPOINTMENT (OUTPATIENT)
Dept: HUMAN REPRODUCTION | Facility: CLINIC | Age: 39
End: 2019-05-16
Payer: COMMERCIAL

## 2019-05-16 VITALS
BODY MASS INDEX: 27.99 KG/M2 | SYSTOLIC BLOOD PRESSURE: 122 MMHG | DIASTOLIC BLOOD PRESSURE: 60 MMHG | WEIGHT: 168 LBS | HEART RATE: 73 BPM | TEMPERATURE: 98 F | OXYGEN SATURATION: 98 % | HEIGHT: 65 IN

## 2019-05-16 PROCEDURE — 99213 OFFICE O/P EST LOW 20 MIN: CPT | Mod: 25

## 2019-05-16 PROCEDURE — 99214 OFFICE O/P EST MOD 30 MIN: CPT | Mod: 25

## 2019-05-16 PROCEDURE — 58340 CATHETER FOR HYSTEROGRAPHY: CPT

## 2019-05-16 PROCEDURE — 36415 COLL VENOUS BLD VENIPUNCTURE: CPT

## 2019-05-16 PROCEDURE — 76831 ECHO EXAM UTERUS: CPT

## 2019-05-16 NOTE — HISTORY OF PRESENT ILLNESS
[FreeTextEntry1] : \par now working as a  HHA \par \par needs drug screening \par strips filled \par \par DM type 2 - saw endo AIc 6.9 5/10/19 \par - on metformin 500 2 am amd 2 pm \par - lost 20 lbs in 9/2018 now back 12 lbs \par - exercising 2-3 days a weeks for 30 minutes, and has lost weight feels good wanted to know if she can cut her medication to once a day , now eating low carb diet ,no juices etc \par not checking sugar readings \par saw ophtho 7/2018 all ok no retinopathy  \par \par also seeing fertility doctor to get IVF -rxs \par \par h/o PPD + did chest xray 1981 \par -started 11/2016 INH and Pyridoxine , did CT chest results reviewed nl , will finish in July \par -Denies any symptoms of actinic keratosis. No night sweats, chronic cough, weight loss etc\par \par H/o GERD 2008 did EGD x 2 in [place] was given Rx for GASTRO- took omeprazole 40 for 1 month- came back \par - also c/o acid reflux wants to see gastro \par

## 2019-05-16 NOTE — PHYSICAL EXAM
[No Acute Distress] : no acute distress [Well Nourished] : well nourished [No Respiratory Distress] : no respiratory distress  [Well Developed] : well developed [Clear to Auscultation] : lungs were clear to auscultation bilaterally [Normal Rate] : normal rate  [No Accessory Muscle Use] : no accessory muscle use [Regular Rhythm] : with a regular rhythm [Normal S1, S2] : normal S1 and S2 [Soft] : abdomen soft [Normal Bowel Sounds] : normal bowel sounds [Non Tender] : non-tender

## 2019-05-16 NOTE — REVIEW OF SYSTEMS
[Negative] : Gastrointestinal [Chest Pain] : no chest pain [Shortness Of Breath] : no shortness of breath [Dyspnea on Exertion] : no dyspnea on exertion [Lower Ext Edema] : no lower extremity edema [Abdominal Pain] : no abdominal pain

## 2019-05-20 ENCOUNTER — RESULT REVIEW (OUTPATIENT)
Age: 39
End: 2019-05-20

## 2019-05-20 LAB
17OHP SERPL-MCNC: 36 NG/DL
ALBUMIN SERPL ELPH-MCNC: 4.3 G/DL
ALP BLD-CCNC: 67 U/L
ALT SERPL-CCNC: 12 U/L
ANION GAP SERPL CALC-SCNC: 14 MMOL/L
AST SERPL-CCNC: 15 U/L
BILIRUB SERPL-MCNC: 0.2 MG/DL
BUN SERPL-MCNC: 12 MG/DL
CALCIUM SERPL-MCNC: 9.9 MG/DL
CHLORIDE SERPL-SCNC: 102 MMOL/L
CO2 SERPL-SCNC: 24 MMOL/L
CREAT SERPL-MCNC: 0.61 MG/DL
DRUG ABUSE PANEL-9, SERUM: NORMAL
ESTIMATED AVERAGE GLUCOSE: 151 MG/DL
GLUCOSE SERPL-MCNC: 93 MG/DL
HBA1C MFR BLD HPLC: 6.9 %
HCG SERPL-MCNC: <1 MIU/ML
POTASSIUM SERPL-SCNC: 4.3 MMOL/L
PROT SERPL-MCNC: 7.1 G/DL
SODIUM SERPL-SCNC: 140 MMOL/L

## 2019-05-22 ENCOUNTER — FORM ENCOUNTER (OUTPATIENT)
Age: 39
End: 2019-05-22

## 2019-05-23 ENCOUNTER — OUTPATIENT (OUTPATIENT)
Dept: OUTPATIENT SERVICES | Facility: HOSPITAL | Age: 39
LOS: 1 days | End: 2019-05-23
Payer: COMMERCIAL

## 2019-05-23 ENCOUNTER — APPOINTMENT (OUTPATIENT)
Dept: HUMAN REPRODUCTION | Facility: CLINIC | Age: 39
End: 2019-05-23
Payer: COMMERCIAL

## 2019-05-23 ENCOUNTER — APPOINTMENT (OUTPATIENT)
Dept: ULTRASOUND IMAGING | Facility: CLINIC | Age: 39
End: 2019-05-23
Payer: COMMERCIAL

## 2019-05-23 DIAGNOSIS — E11.65 TYPE 2 DIABETES MELLITUS WITH HYPERGLYCEMIA: ICD-10-CM

## 2019-05-23 PROCEDURE — 76830 TRANSVAGINAL US NON-OB: CPT

## 2019-05-23 PROCEDURE — 99213 OFFICE O/P EST LOW 20 MIN: CPT | Mod: 25

## 2019-05-23 PROCEDURE — 76536 US EXAM OF HEAD AND NECK: CPT | Mod: 26

## 2019-05-23 PROCEDURE — 58999 UNLISTED PX FML GENITAL SYS: CPT

## 2019-05-23 PROCEDURE — 76536 US EXAM OF HEAD AND NECK: CPT

## 2019-05-30 ENCOUNTER — RESULT REVIEW (OUTPATIENT)
Age: 39
End: 2019-05-30

## 2019-05-30 ENCOUNTER — APPOINTMENT (OUTPATIENT)
Dept: HUMAN REPRODUCTION | Facility: CLINIC | Age: 39
End: 2019-05-30
Payer: COMMERCIAL

## 2019-05-30 PROCEDURE — 99213 OFFICE O/P EST LOW 20 MIN: CPT | Mod: 25

## 2019-05-30 PROCEDURE — 76830 TRANSVAGINAL US NON-OB: CPT

## 2019-05-30 PROCEDURE — 36415 COLL VENOUS BLD VENIPUNCTURE: CPT

## 2019-06-13 ENCOUNTER — APPOINTMENT (OUTPATIENT)
Dept: GASTROENTEROLOGY | Facility: CLINIC | Age: 39
End: 2019-06-13
Payer: COMMERCIAL

## 2019-06-13 VITALS
HEIGHT: 65 IN | BODY MASS INDEX: 28.32 KG/M2 | HEART RATE: 100 BPM | WEIGHT: 170 LBS | DIASTOLIC BLOOD PRESSURE: 84 MMHG | SYSTOLIC BLOOD PRESSURE: 115 MMHG

## 2019-06-13 PROCEDURE — 99204 OFFICE O/P NEW MOD 45 MIN: CPT

## 2019-06-13 RX ORDER — CLOMIPHENE CITRATE 50 MG/1
50 TABLET ORAL
Qty: 10 | Refills: 0 | Status: DISCONTINUED | COMMUNITY
Start: 2018-10-12 | End: 2019-06-13

## 2019-06-13 NOTE — REVIEW OF SYSTEMS
[Chest Pain] : chest pain [Sore Throat] : sore throat [Heartburn] : heartburn [Negative] : Endocrine

## 2019-06-13 NOTE — ASSESSMENT
[FreeTextEntry1] : 1.  Epigastric pain, improved on PPI.  History of H.pylori in the past.  Likely GERD with esophagitis.  Differential includes recurrent H.pylori gastritis, peptic ulcer, hiatus hernia, functional heartburn, motility disorder.  Biliary colic unlikely.\par 2.  Diabetes mellitus.\par 3.  HLD.\par 4.  PCOS.\par \par Recs:\par - Recent labs reviewed.\par - Continue PPI for 2-4 weeks.\par - Patient was counseled on GERD lifestyle modifications including head of bed elevation at night, avoiding lying down 2-3 hours after eating, weight loss, avoiding tight-fitting clothing, eating small, frequent meals, and minimizing potential food triggers (caffeine, spicy foods, citrus foods, chocolate, mint, alcohol).  AGA guideline provided.\par - The patient was offered stool testing for H.pylori with continued conservative management.  However, she would prefer endoscopic evaluation- procedure, risks, benefits, and alternatives were explained. Patient agreeable. Brochure given.\par

## 2019-06-13 NOTE — CONSULT LETTER
[Dear  ___] : Dear  [unfilled], [Consult Letter:] : I had the pleasure of evaluating your patient, [unfilled]. [Please see my note below.] : Please see my note below. [Consult Closing:] : Thank you very much for allowing me to participate in the care of this patient.  If you have any questions, please do not hesitate to contact me. [Sincerely,] : Sincerely, [FreeTextEntry3] : Orville Gibson MD\par Department of Gastroenterology\par [facility]\par [address] [place] [address], [facility] [address]\[place] [address]\par Tel: [phone]\par Email: [email or web address]

## 2019-06-13 NOTE — HISTORY OF PRESENT ILLNESS
[Heartburn] : improved heartburn [Nausea] : denies nausea [Vomiting] : denies vomiting [Diarrhea] : denies diarrhea [Constipation] : denies constipation [Yellow Skin Or Eyes (Jaundice)] : denies jaundice [Abdominal Pain] : denies abdominal pain [Abdominal Swelling] : denies abdominal swelling [Rectal Pain] : denies rectal pain [de-identified] : Anika presents to the office today for evaluation of epigastric pain.  She presents with her .\par \par She reports that she started having pain in her epigastric region about 3-4 weeks ago.  The pain has radiated  to her left shoulder.  The pain started after she was fasting in an attempt to lose weight.  She felt the pain during the day when her stomach was empty, and eating made the pain better.  She started taking omeprazole 20 mg BID and the pain has greatly subsided.  The patient has had prior episodes of pain in the past and undergone two prior EGDs in [facility] in 2007 and 2010.  At that time she was told she had gastritis and was treated for H.pylori.  The patient denies any dysphagia, nausea, change in bowel habits, GI bleeding, unintentional weight loss or family history of GI malignancies.  Despite improvement in the pain, she continues to worry that something worse than gastritis is responsible.

## 2019-06-20 ENCOUNTER — APPOINTMENT (OUTPATIENT)
Dept: OPHTHALMOLOGY | Facility: CLINIC | Age: 39
End: 2019-06-20
Payer: COMMERCIAL

## 2019-06-20 PROCEDURE — 92014 COMPRE OPH EXAM EST PT 1/>: CPT

## 2019-06-25 ENCOUNTER — APPOINTMENT (OUTPATIENT)
Dept: HUMAN REPRODUCTION | Facility: CLINIC | Age: 39
End: 2019-06-25
Payer: COMMERCIAL

## 2019-06-25 PROCEDURE — 99213 OFFICE O/P EST LOW 20 MIN: CPT | Mod: 25

## 2019-06-25 PROCEDURE — 76830 TRANSVAGINAL US NON-OB: CPT

## 2019-06-25 RX ORDER — DESOGESTREL AND ETHINYL ESTRADIOL 0.15-0.03
0.15-3 KIT ORAL DAILY
Qty: 28 | Refills: 2 | Status: DISCONTINUED | COMMUNITY
Start: 2019-06-25 | End: 2019-06-25

## 2019-07-01 ENCOUNTER — LABORATORY RESULT (OUTPATIENT)
Age: 39
End: 2019-07-01

## 2019-07-02 ENCOUNTER — APPOINTMENT (OUTPATIENT)
Dept: HUMAN REPRODUCTION | Facility: CLINIC | Age: 39
End: 2019-07-02
Payer: COMMERCIAL

## 2019-07-02 ENCOUNTER — APPOINTMENT (OUTPATIENT)
Dept: GASTROENTEROLOGY | Facility: CLINIC | Age: 39
End: 2019-07-02
Payer: COMMERCIAL

## 2019-07-02 PROCEDURE — 99213 OFFICE O/P EST LOW 20 MIN: CPT | Mod: 25

## 2019-07-02 PROCEDURE — 36415 COLL VENOUS BLD VENIPUNCTURE: CPT

## 2019-07-02 PROCEDURE — 76830 TRANSVAGINAL US NON-OB: CPT

## 2019-07-02 PROCEDURE — 43239 EGD BIOPSY SINGLE/MULTIPLE: CPT

## 2019-07-09 ENCOUNTER — APPOINTMENT (OUTPATIENT)
Dept: HUMAN REPRODUCTION | Facility: CLINIC | Age: 39
End: 2019-07-09
Payer: COMMERCIAL

## 2019-07-09 PROCEDURE — 99213 OFFICE O/P EST LOW 20 MIN: CPT | Mod: 25

## 2019-07-09 PROCEDURE — 76830 TRANSVAGINAL US NON-OB: CPT

## 2019-07-09 PROCEDURE — 36415 COLL VENOUS BLD VENIPUNCTURE: CPT

## 2019-07-18 ENCOUNTER — APPOINTMENT (OUTPATIENT)
Dept: HUMAN REPRODUCTION | Facility: CLINIC | Age: 39
End: 2019-07-18
Payer: COMMERCIAL

## 2019-07-18 PROCEDURE — 36415 COLL VENOUS BLD VENIPUNCTURE: CPT

## 2019-07-18 PROCEDURE — 99213Y: CUSTOM | Mod: 25

## 2019-07-18 PROCEDURE — 76830 TRANSVAGINAL US NON-OB: CPT

## 2019-07-20 ENCOUNTER — APPOINTMENT (OUTPATIENT)
Dept: HUMAN REPRODUCTION | Facility: CLINIC | Age: 39
End: 2019-07-20
Payer: COMMERCIAL

## 2019-07-20 PROCEDURE — 99213 OFFICE O/P EST LOW 20 MIN: CPT | Mod: 25

## 2019-07-20 PROCEDURE — 76830 TRANSVAGINAL US NON-OB: CPT

## 2019-07-20 PROCEDURE — 36415 COLL VENOUS BLD VENIPUNCTURE: CPT

## 2019-07-21 ENCOUNTER — APPOINTMENT (OUTPATIENT)
Dept: HUMAN REPRODUCTION | Facility: CLINIC | Age: 39
End: 2019-07-21
Payer: COMMERCIAL

## 2019-07-21 PROCEDURE — 58322 ARTIFICIAL INSEMINATION: CPT

## 2019-07-21 PROCEDURE — 99213 OFFICE O/P EST LOW 20 MIN: CPT | Mod: 25

## 2019-07-23 ENCOUNTER — APPOINTMENT (OUTPATIENT)
Dept: HUMAN REPRODUCTION | Facility: CLINIC | Age: 39
End: 2019-07-23
Payer: COMMERCIAL

## 2019-07-23 PROCEDURE — 99212 OFFICE O/P EST SF 10 MIN: CPT | Mod: 25

## 2019-07-23 PROCEDURE — 36415 COLL VENOUS BLD VENIPUNCTURE: CPT

## 2019-07-23 PROCEDURE — 76830 TRANSVAGINAL US NON-OB: CPT

## 2019-07-24 ENCOUNTER — APPOINTMENT (OUTPATIENT)
Dept: HUMAN REPRODUCTION | Facility: CLINIC | Age: 39
End: 2019-07-24
Payer: COMMERCIAL

## 2019-08-01 ENCOUNTER — APPOINTMENT (OUTPATIENT)
Dept: ENDOCRINOLOGY | Facility: CLINIC | Age: 39
End: 2019-08-01
Payer: COMMERCIAL

## 2019-08-01 ENCOUNTER — APPOINTMENT (OUTPATIENT)
Dept: INTERNAL MEDICINE | Facility: CLINIC | Age: 39
End: 2019-08-01

## 2019-08-01 VITALS
WEIGHT: 164 LBS | HEIGHT: 65 IN | OXYGEN SATURATION: 99 % | DIASTOLIC BLOOD PRESSURE: 72 MMHG | SYSTOLIC BLOOD PRESSURE: 126 MMHG | HEART RATE: 89 BPM | BODY MASS INDEX: 27.32 KG/M2

## 2019-08-01 LAB
GLUCOSE BLDC GLUCOMTR-MCNC: 125
HBA1C MFR BLD HPLC: 6.6

## 2019-08-01 PROCEDURE — 36415 COLL VENOUS BLD VENIPUNCTURE: CPT

## 2019-08-01 PROCEDURE — 99214 OFFICE O/P EST MOD 30 MIN: CPT | Mod: 25

## 2019-08-01 PROCEDURE — 82962 GLUCOSE BLOOD TEST: CPT

## 2019-08-01 PROCEDURE — 83036 HEMOGLOBIN GLYCOSYLATED A1C: CPT | Mod: QW

## 2019-08-01 RX ORDER — CLOMIPHENE CITRATE 50 MG/1
50 TABLET ORAL
Qty: 10 | Refills: 0 | Status: DISCONTINUED | COMMUNITY
Start: 2019-06-25 | End: 2019-08-01

## 2019-08-01 NOTE — HISTORY OF PRESENT ILLNESS
[FreeTextEntry1] : 37 yo F with PMH infertility, PCOS, DM2, HLD latent TB post INH\par \par DM2\par Dx 7/2016\par Denies neuropathy\par on metformin 500 2 tab q am and 2 tabs q pm \par ophthalmologist visit 7/19\par Has never seen a podiatrist\par B egg + oatmeal + water\par L: veg + rice + cken + water\par D: 1/4 plate of pasta + water\par snacks on crackers\par please refer to scanned glucose log \par \par Infertility\par She has been attempting pregnancy for > 2 years\par at initial evaluation she reports cycles had been q 26-29 days\par admits to acne\par denies hirsutism. denies excessive hair loss\par pelvic US 8/25/16 did not reveal PCOM\par normal hysterosalpingogram 2017\par Post IUI 2 weeks ago\par menarche at 12 years old\par GYN: Bennett Woody\par \par \par normal thyroid US

## 2019-08-01 NOTE — ASSESSMENT
[FreeTextEntry1] : 39 yo F with PMH infertility, PCOS, DM2, HLD latent TB post INH, currently attempting pregnancy\par \par 1. DM2 - continue metformin to 500 mg 2 tabs in the AM and 2 tabs in the PM. Refer to nutritionist. Will mail in glucose log in 2 weeks. Will not start statin due to desired pregnancy \par \par 2. Infertility/PCOS -  on metformin. post clomid . post IUI as above. \par \par .\par \par \par

## 2019-08-08 LAB
25(OH)D3 SERPL-MCNC: 40.7 NG/ML
ALBUMIN SERPL ELPH-MCNC: 4.3 G/DL
ALP BLD-CCNC: 54 U/L
ALT SERPL-CCNC: 10 U/L
ANION GAP SERPL CALC-SCNC: 17 MMOL/L
AST SERPL-CCNC: 14 U/L
BILIRUB SERPL-MCNC: 0.3 MG/DL
BUN SERPL-MCNC: 9 MG/DL
CALCIUM SERPL-MCNC: 9.8 MG/DL
CHLORIDE SERPL-SCNC: 103 MMOL/L
CHOLEST SERPL-MCNC: 183 MG/DL
CHOLEST/HDLC SERPL: 3.2 RATIO
CK SERPL-CCNC: 128 U/L
CO2 SERPL-SCNC: 22 MMOL/L
CREAT SERPL-MCNC: 0.67 MG/DL
ESTIMATED AVERAGE GLUCOSE: 143 MG/DL
GLUCOSE SERPL-MCNC: 117 MG/DL
HBA1C MFR BLD HPLC: 6.6 %
HDLC SERPL-MCNC: 58 MG/DL
LDLC SERPL CALC-MCNC: 103 MG/DL
POTASSIUM SERPL-SCNC: 4.4 MMOL/L
PROT SERPL-MCNC: 6.9 G/DL
SODIUM SERPL-SCNC: 142 MMOL/L
T4 FREE SERPL-MCNC: 1.4 NG/DL
TRIGL SERPL-MCNC: 110 MG/DL
TSH SERPL-ACNC: 1.65 UIU/ML

## 2019-09-10 ENCOUNTER — APPOINTMENT (OUTPATIENT)
Dept: HUMAN REPRODUCTION | Facility: CLINIC | Age: 39
End: 2019-09-10
Payer: COMMERCIAL

## 2019-09-10 PROCEDURE — 36415 COLL VENOUS BLD VENIPUNCTURE: CPT

## 2019-09-10 PROCEDURE — 99213Y: CUSTOM | Mod: 25

## 2019-09-10 PROCEDURE — 76830 TRANSVAGINAL US NON-OB: CPT

## 2019-09-13 ENCOUNTER — APPOINTMENT (OUTPATIENT)
Dept: INTERNAL MEDICINE | Facility: CLINIC | Age: 39
End: 2019-09-13
Payer: COMMERCIAL

## 2019-09-13 VITALS
HEART RATE: 78 BPM | TEMPERATURE: 98.1 F | OXYGEN SATURATION: 99 % | SYSTOLIC BLOOD PRESSURE: 106 MMHG | HEIGHT: 65 IN | WEIGHT: 162 LBS | DIASTOLIC BLOOD PRESSURE: 62 MMHG | BODY MASS INDEX: 26.99 KG/M2

## 2019-09-13 PROCEDURE — 99214 OFFICE O/P EST MOD 30 MIN: CPT | Mod: 25

## 2019-09-13 PROCEDURE — 90732 PPSV23 VACC 2 YRS+ SUBQ/IM: CPT

## 2019-09-13 PROCEDURE — G0009: CPT

## 2019-09-13 NOTE — ASSESSMENT
[FreeTextEntry1] : 39 yo F with recently diagnosed DMII (A1C 11.1% 7/2016 ) presented today for f/u on ch medical conditions \par \par  DM \par -AIC 6.6 8/1/19 \par - advised to increase exercise to 30 minutes daily , do resistance training 2 x a week 20 minutes \par -no neuropathy , ophtho 7/2018  \par --reenforced compliance with metformin change to to 500 po 2 x  daily after food if true value \par - Reminded patient to maintain healthy eating habits (avoid sweets, carbs) and to continue exercising as she has been, including honey \par - Continue monitoring sugars\par - F/u appointment in 3 months\par -pneumovax 23 given today \par \par  Hyperlipidemia\par - self discontinued statins - refused medications \par - low fat diet reviewed \par \par h/o +PPD since 11/2016 rx \par - finished rx NH/Pyridoxine, check LFT \par \par  GERD\par -start omeprazole 40 po daily 30 minutes prior to breakfast 1 month trial \par -Educated patient lifestyle modification, advice to avoid fried food, greasy oily and spicy foods, avoid tomato, orange, lemon , or caffeinated beverages.\par -Avoid reclining upto 3 hours after meals\par -referral to gastro for egd \par \par Papular acne - seeing dermatologist on doxy , clinda / face wash \par \par HCM \par tdap - 2016\par PAP- 2018 as per pt \par flu vaccine given 9/2018 \par pneumonia 23 vaccine given today

## 2019-09-13 NOTE — HISTORY OF PRESENT ILLNESS
[FreeTextEntry1] : \par now working as a  HHA \par \par f/u on ch medical issues \par \par acne vulgaris- needs referral for dermatologist \par \par DM type 2 - saw endo AIc 8/1/19 6.6  \par - on metformin 500 2 am and 2 pm \par -exercsing now \par - exercising 2-3 days a weeks for 30 minutes, and has lost weight feels good wanted to know if she can cut her medication to once a day , now eating low carb diet ,no juices etc \par not checking sugar readings \par saw ophtho 6/2019  all ok no retinopathy  \par \par also seeing fertility doctor to get IVF -rxs \par \par h/o PPD + did chest xray 1981 \par -started 11/2016 INH and Pyridoxine , did CT chest results reviewed nl , will finish in July \par -Denies any symptoms of actinic keratosis. No night sweats, chronic cough, weight loss etc\par \par H/o GERD 2008 did EGD x 2 in [place] was given Rx for GASTRO- took omeprazole 40 for 1 month- came back \par - also c/o acid reflux wants to see gastro \par

## 2019-09-17 ENCOUNTER — APPOINTMENT (OUTPATIENT)
Dept: HUMAN REPRODUCTION | Facility: CLINIC | Age: 39
End: 2019-09-17
Payer: COMMERCIAL

## 2019-09-17 PROCEDURE — 76830 TRANSVAGINAL US NON-OB: CPT

## 2019-09-17 PROCEDURE — 36415 COLL VENOUS BLD VENIPUNCTURE: CPT

## 2019-09-17 PROCEDURE — 99213 OFFICE O/P EST LOW 20 MIN: CPT | Mod: 25

## 2019-09-20 ENCOUNTER — RX RENEWAL (OUTPATIENT)
Age: 39
End: 2019-09-20

## 2019-09-20 ENCOUNTER — APPOINTMENT (OUTPATIENT)
Dept: HUMAN REPRODUCTION | Facility: CLINIC | Age: 39
End: 2019-09-20

## 2019-09-25 ENCOUNTER — APPOINTMENT (OUTPATIENT)
Dept: HUMAN REPRODUCTION | Facility: CLINIC | Age: 39
End: 2019-09-25
Payer: COMMERCIAL

## 2019-09-25 PROCEDURE — 36415 COLL VENOUS BLD VENIPUNCTURE: CPT

## 2019-09-25 PROCEDURE — 99213 OFFICE O/P EST LOW 20 MIN: CPT | Mod: 25

## 2019-09-25 PROCEDURE — 76830 TRANSVAGINAL US NON-OB: CPT

## 2019-09-27 ENCOUNTER — APPOINTMENT (OUTPATIENT)
Dept: ENDOCRINOLOGY | Facility: CLINIC | Age: 39
End: 2019-09-27
Payer: COMMERCIAL

## 2019-09-27 VITALS
DIASTOLIC BLOOD PRESSURE: 74 MMHG | BODY MASS INDEX: 26.99 KG/M2 | OXYGEN SATURATION: 99 % | SYSTOLIC BLOOD PRESSURE: 110 MMHG | HEART RATE: 79 BPM | HEIGHT: 65 IN | WEIGHT: 162 LBS

## 2019-09-27 LAB — GLUCOSE BLDC GLUCOMTR-MCNC: 86

## 2019-09-27 PROCEDURE — 36415 COLL VENOUS BLD VENIPUNCTURE: CPT

## 2019-09-27 PROCEDURE — 90686 IIV4 VACC NO PRSV 0.5 ML IM: CPT

## 2019-09-27 PROCEDURE — 99214 OFFICE O/P EST MOD 30 MIN: CPT | Mod: 25

## 2019-09-27 PROCEDURE — 82962 GLUCOSE BLOOD TEST: CPT

## 2019-09-27 PROCEDURE — G0008: CPT

## 2019-09-29 NOTE — HISTORY OF PRESENT ILLNESS
[FreeTextEntry1] : 37 yo F with PMH infertility, PCOS, DM2, HLD latent TB post INH\par \par DM2\par Dx 7/2016\par Denies neuropathy\par on metformin 500 2 tab q am and 2 tabs q pm \par ophthalmologist visit 7/19\par B egg + oatmeal + water\par L: veg + rice + cken + water\par D: 1/4 plate of pasta + water\par snacks on crackers and potato chips\par please refer to scanned glucose log \par exercises 1 hr per day on a bike\par \par Infertility\par She has been attempting pregnancy for > 2 years\par at initial evaluation she reports cycles had been q 26-29 days\par admits to acne\par denies hirsutism. denies excessive hair loss\par pelvic US 8/25/16 did not reveal PCOM\par normal hysterosalpingogram 2017\par Post IUI July - unsuccessful\par menarche at 12 years old\par GYN: Bennett Woody\par normal thyroid US

## 2019-09-29 NOTE — ASSESSMENT
[FreeTextEntry1] : 37 yo F with PMH infertility, PCOS, DM2, HLD latent TB post INH, currently attempting pregnancy\par \par 1. DM2 - continue metformin to 500 mg 2 tabs in the AM and 2 tabs in the PM. Refer to nutritionist. Will not start statin due to desired pregnancy Flu vaccine given today\par \par 2. Infertility/PCOS -  on metformin. . post IUI as above. following with repro endo\par \par .\par \par \par

## 2019-10-03 ENCOUNTER — APPOINTMENT (OUTPATIENT)
Dept: HUMAN REPRODUCTION | Facility: CLINIC | Age: 39
End: 2019-10-03
Payer: COMMERCIAL

## 2019-10-03 PROCEDURE — 76830 TRANSVAGINAL US NON-OB: CPT

## 2019-10-03 PROCEDURE — 99213 OFFICE O/P EST LOW 20 MIN: CPT | Mod: 25

## 2019-10-03 PROCEDURE — 36415 COLL VENOUS BLD VENIPUNCTURE: CPT

## 2019-10-05 ENCOUNTER — RESULT REVIEW (OUTPATIENT)
Age: 39
End: 2019-10-05

## 2019-10-05 LAB
ALBUMIN SERPL ELPH-MCNC: 4.3 G/DL
ALP BLD-CCNC: 53 U/L
ALT SERPL-CCNC: 12 U/L
ANION GAP SERPL CALC-SCNC: 13 MMOL/L
AST SERPL-CCNC: 13 U/L
BILIRUB SERPL-MCNC: 0.4 MG/DL
BUN SERPL-MCNC: 9 MG/DL
CALCIUM SERPL-MCNC: 9.5 MG/DL
CHLORIDE SERPL-SCNC: 104 MMOL/L
CO2 SERPL-SCNC: 23 MMOL/L
CREAT SERPL-MCNC: 0.65 MG/DL
CREAT SPEC-SCNC: 192 MG/DL
ESTIMATED AVERAGE GLUCOSE: 131 MG/DL
GLUCOSE SERPL-MCNC: 85 MG/DL
HBA1C MFR BLD HPLC: 6.2 %
MICROALBUMIN 24H UR DL<=1MG/L-MCNC: <1.2 MG/DL
MICROALBUMIN/CREAT 24H UR-RTO: NORMAL MG/G
POTASSIUM SERPL-SCNC: 4.3 MMOL/L
PROT SERPL-MCNC: 6.7 G/DL
SODIUM SERPL-SCNC: 140 MMOL/L

## 2019-10-10 ENCOUNTER — APPOINTMENT (OUTPATIENT)
Dept: HUMAN REPRODUCTION | Facility: CLINIC | Age: 39
End: 2019-10-10
Payer: COMMERCIAL

## 2019-10-10 PROCEDURE — 99213 OFFICE O/P EST LOW 20 MIN: CPT

## 2019-10-10 PROCEDURE — 76830 TRANSVAGINAL US NON-OB: CPT

## 2019-10-13 ENCOUNTER — APPOINTMENT (OUTPATIENT)
Dept: HUMAN REPRODUCTION | Facility: CLINIC | Age: 39
End: 2019-10-13
Payer: COMMERCIAL

## 2019-10-13 PROCEDURE — 99213 OFFICE O/P EST LOW 20 MIN: CPT | Mod: 25

## 2019-10-13 PROCEDURE — 58322 ARTIFICIAL INSEMINATION: CPT

## 2019-10-13 PROCEDURE — 89261 SPERM ISOLATION COMPLEX: CPT

## 2019-10-31 ENCOUNTER — APPOINTMENT (OUTPATIENT)
Dept: HUMAN REPRODUCTION | Facility: CLINIC | Age: 39
End: 2019-10-31
Payer: COMMERCIAL

## 2019-10-31 PROCEDURE — 36415 COLL VENOUS BLD VENIPUNCTURE: CPT

## 2019-10-31 PROCEDURE — 76830 TRANSVAGINAL US NON-OB: CPT

## 2019-10-31 PROCEDURE — 99213 OFFICE O/P EST LOW 20 MIN: CPT | Mod: 25

## 2019-11-08 ENCOUNTER — APPOINTMENT (OUTPATIENT)
Dept: HUMAN REPRODUCTION | Facility: CLINIC | Age: 39
End: 2019-11-08
Payer: COMMERCIAL

## 2019-11-08 PROCEDURE — 99213 OFFICE O/P EST LOW 20 MIN: CPT | Mod: 25

## 2019-11-08 PROCEDURE — 76830 TRANSVAGINAL US NON-OB: CPT

## 2019-11-10 ENCOUNTER — APPOINTMENT (OUTPATIENT)
Dept: HUMAN REPRODUCTION | Facility: CLINIC | Age: 39
End: 2019-11-10
Payer: COMMERCIAL

## 2019-11-10 PROCEDURE — 58322 ARTIFICIAL INSEMINATION: CPT

## 2019-11-10 PROCEDURE — 99213 OFFICE O/P EST LOW 20 MIN: CPT | Mod: 25

## 2019-11-10 PROCEDURE — 89261 SPERM ISOLATION COMPLEX: CPT

## 2019-11-21 ENCOUNTER — APPOINTMENT (OUTPATIENT)
Dept: DERMATOLOGY | Facility: CLINIC | Age: 39
End: 2019-11-21

## 2019-11-26 ENCOUNTER — APPOINTMENT (OUTPATIENT)
Dept: HUMAN REPRODUCTION | Facility: CLINIC | Age: 39
End: 2019-11-26
Payer: COMMERCIAL

## 2019-11-26 PROCEDURE — 76830 TRANSVAGINAL US NON-OB: CPT

## 2019-11-26 PROCEDURE — 99213 OFFICE O/P EST LOW 20 MIN: CPT | Mod: 25

## 2019-11-30 ENCOUNTER — APPOINTMENT (OUTPATIENT)
Dept: HUMAN REPRODUCTION | Facility: CLINIC | Age: 39
End: 2019-11-30
Payer: COMMERCIAL

## 2019-11-30 PROCEDURE — 99213Y: CUSTOM | Mod: 25

## 2019-11-30 PROCEDURE — 36415 COLL VENOUS BLD VENIPUNCTURE: CPT

## 2019-11-30 PROCEDURE — 76830 TRANSVAGINAL US NON-OB: CPT

## 2019-12-03 ENCOUNTER — APPOINTMENT (OUTPATIENT)
Dept: HUMAN REPRODUCTION | Facility: CLINIC | Age: 39
End: 2019-12-03
Payer: COMMERCIAL

## 2019-12-03 PROCEDURE — 76830 TRANSVAGINAL US NON-OB: CPT

## 2019-12-03 PROCEDURE — 99213 OFFICE O/P EST LOW 20 MIN: CPT | Mod: 25

## 2019-12-03 PROCEDURE — 36415 COLL VENOUS BLD VENIPUNCTURE: CPT

## 2019-12-04 ENCOUNTER — APPOINTMENT (OUTPATIENT)
Dept: HUMAN REPRODUCTION | Facility: CLINIC | Age: 39
End: 2019-12-04
Payer: COMMERCIAL

## 2019-12-04 ENCOUNTER — APPOINTMENT (OUTPATIENT)
Dept: HUMAN REPRODUCTION | Facility: CLINIC | Age: 39
End: 2019-12-04

## 2019-12-04 PROCEDURE — 76830 TRANSVAGINAL US NON-OB: CPT

## 2019-12-04 PROCEDURE — 99213 OFFICE O/P EST LOW 20 MIN: CPT | Mod: 25

## 2019-12-04 PROCEDURE — 36415 COLL VENOUS BLD VENIPUNCTURE: CPT

## 2019-12-05 ENCOUNTER — APPOINTMENT (OUTPATIENT)
Dept: HUMAN REPRODUCTION | Facility: CLINIC | Age: 39
End: 2019-12-05
Payer: COMMERCIAL

## 2019-12-05 PROCEDURE — 36415 COLL VENOUS BLD VENIPUNCTURE: CPT

## 2019-12-05 PROCEDURE — 99212 OFFICE O/P EST SF 10 MIN: CPT

## 2019-12-06 ENCOUNTER — APPOINTMENT (OUTPATIENT)
Dept: HUMAN REPRODUCTION | Facility: CLINIC | Age: 39
End: 2019-12-06
Payer: COMMERCIAL

## 2019-12-06 PROCEDURE — 89280 ASSIST OOCYTE FERTILIZATION: CPT

## 2019-12-06 PROCEDURE — 89250 CULTR OOCYTE/EMBRYO <4 DAYS: CPT

## 2019-12-06 PROCEDURE — 58970 RETRIEVAL OF OOCYTE: CPT

## 2019-12-06 PROCEDURE — 89254 OOCYTE IDENTIFICATION: CPT

## 2019-12-06 PROCEDURE — 89261 SPERM ISOLATION COMPLEX: CPT

## 2019-12-06 PROCEDURE — 76948 ECHO GUIDE OVA ASPIRATION: CPT

## 2019-12-06 PROCEDURE — 89398 UNLISTED REPROD MED LAB PROC: CPT

## 2019-12-09 PROCEDURE — 89272 EXTENDED CULTURE OF OOCYTES: CPT

## 2019-12-11 ENCOUNTER — APPOINTMENT (OUTPATIENT)
Dept: HUMAN REPRODUCTION | Facility: CLINIC | Age: 39
End: 2019-12-11
Payer: COMMERCIAL

## 2019-12-11 PROCEDURE — 76942 ECHO GUIDE FOR BIOPSY: CPT

## 2019-12-11 PROCEDURE — 58974 EMBRYO TRANSFER INTRAUTERINE: CPT

## 2019-12-11 PROCEDURE — 89255 PREPARE EMBRYO FOR TRANSFER: CPT

## 2019-12-11 PROCEDURE — 89253 EMBRYO HATCHING: CPT

## 2019-12-13 ENCOUNTER — APPOINTMENT (OUTPATIENT)
Dept: HUMAN REPRODUCTION | Facility: CLINIC | Age: 39
End: 2019-12-13
Payer: COMMERCIAL

## 2019-12-13 ENCOUNTER — APPOINTMENT (OUTPATIENT)
Dept: HUMAN REPRODUCTION | Facility: CLINIC | Age: 39
End: 2019-12-13

## 2019-12-13 PROCEDURE — 99213 OFFICE O/P EST LOW 20 MIN: CPT | Mod: 25

## 2019-12-13 PROCEDURE — 76830 TRANSVAGINAL US NON-OB: CPT

## 2019-12-19 ENCOUNTER — APPOINTMENT (OUTPATIENT)
Dept: INTERNAL MEDICINE | Facility: CLINIC | Age: 39
End: 2019-12-19
Payer: COMMERCIAL

## 2019-12-19 VITALS
WEIGHT: 154 LBS | HEART RATE: 82 BPM | DIASTOLIC BLOOD PRESSURE: 76 MMHG | HEIGHT: 65 IN | BODY MASS INDEX: 25.66 KG/M2 | OXYGEN SATURATION: 98 % | SYSTOLIC BLOOD PRESSURE: 118 MMHG | TEMPERATURE: 98.5 F

## 2019-12-19 LAB — HBA1C MFR BLD HPLC: 5.6

## 2019-12-19 PROCEDURE — 36415 COLL VENOUS BLD VENIPUNCTURE: CPT

## 2019-12-19 PROCEDURE — 99214 OFFICE O/P EST MOD 30 MIN: CPT | Mod: 25

## 2019-12-19 PROCEDURE — 83036 HEMOGLOBIN GLYCOSYLATED A1C: CPT | Mod: QW

## 2019-12-19 NOTE — ASSESSMENT
[FreeTextEntry1] : 39 yo F with recently diagnosed DMII (A1C 11.1% 7/2016 ) presented today for f/u on ch medical conditions \par \par got IVF last week wednesday \par - before 8 IUI \par -will be seeing them tomorrow for BW \par \par  DM - POC aic today - \par -AIC 6.2 9/19 - 5.6 ??? error will get venous draw \par - advised to increase exercise to 30 minutes daily , do resistance training 2 x a week 20 minutes \par -no neuropathy , ophtho 7/2018 \par --reenforced compliance with metformin change to to 500 po 2 x daily after food if true value \par - Reminded patient to maintain healthy eating habits (avoid sweets, carbs) and to continue exercising as she has been, including honey \par - Continue monitoring sugars\par - F/u appointment in 3 months\par -pneumovax 23 given today \par \par  Hyperlipidemia\par - self discontinued statins - refused medications \par - low fat diet reviewed \par \par h/o +PPD since 11/2016 rx \par - finished rx NH/Pyridoxine\par \par  GERD\par - resolved , stopped PPI  \par -Educated patient lifestyle modification, advice to avoid fried food, greasy oily and spicy foods, avoid tomato, orange, lemon , or caffeinated beverages.\par -Avoid reclining upto 3 hours after meals\par \par Papular acne - seeing dermatologist on doxy , clinda / face wash \par \par HCM \par tdap - 2016\par PAP- 2018 as per pt \par flu vaccine given 9/2018 \par pneumonia 23 vaccine 9/2019\par

## 2019-12-19 NOTE — HISTORY OF PRESENT ILLNESS
[de-identified] : f/u on ch medical issues \par \par acne vulgaris- has to make appt for dermatologist \par \par DM type 2 - saw endo AIc 9/2019 - 6.2 \par - on metformin 500 2 am and 2 pm \par -exercsing now \par - exercising 2-3 days a weeks for 30 minutes, and has lost weight feels good wanted to know if she can cut her medication to once a day , now eating low carb diet ,no juices etc \par not checking sugar readings \par saw ophtho 6/2019 all ok no retinopathy \par \par also seeing fertility doctor to get IVF -rxs \par \par h/o PPD + did chest xray 1981 \par -started 11/2016 INH and Pyridoxine , did CT chest results reviewed nl , will finish in July \par -Denies any symptoms of actinic keratosis. No night sweats, chronic cough, weight loss etc\par \par H/o GERD 2008 did EGD x 2 in [place] was given Rx for GASTRO- took omeprazole 40 for 1 month- came back \par - also c/o acid reflux wants to see gastro

## 2019-12-20 ENCOUNTER — APPOINTMENT (OUTPATIENT)
Dept: HUMAN REPRODUCTION | Facility: CLINIC | Age: 39
End: 2019-12-20
Payer: COMMERCIAL

## 2019-12-20 LAB
ESTIMATED AVERAGE GLUCOSE: 126 MG/DL
HBA1C MFR BLD HPLC: 6 %

## 2019-12-20 PROCEDURE — 99212 OFFICE O/P EST SF 10 MIN: CPT

## 2019-12-20 PROCEDURE — 36415 COLL VENOUS BLD VENIPUNCTURE: CPT

## 2020-01-02 ENCOUNTER — APPOINTMENT (OUTPATIENT)
Dept: HUMAN REPRODUCTION | Facility: CLINIC | Age: 40
End: 2020-01-02
Payer: COMMERCIAL

## 2020-01-02 PROCEDURE — 99213 OFFICE O/P EST LOW 20 MIN: CPT | Mod: 25

## 2020-01-02 PROCEDURE — 76817 TRANSVAGINAL US OBSTETRIC: CPT

## 2020-01-09 ENCOUNTER — APPOINTMENT (OUTPATIENT)
Dept: HUMAN REPRODUCTION | Facility: CLINIC | Age: 40
End: 2020-01-09
Payer: COMMERCIAL

## 2020-01-09 PROCEDURE — 76817 TRANSVAGINAL US OBSTETRIC: CPT

## 2020-01-09 PROCEDURE — 99213 OFFICE O/P EST LOW 20 MIN: CPT | Mod: 25

## 2020-01-16 ENCOUNTER — APPOINTMENT (OUTPATIENT)
Dept: HUMAN REPRODUCTION | Facility: CLINIC | Age: 40
End: 2020-01-16
Payer: COMMERCIAL

## 2020-01-16 PROCEDURE — 99213 OFFICE O/P EST LOW 20 MIN: CPT | Mod: 25

## 2020-01-16 PROCEDURE — 76817 TRANSVAGINAL US OBSTETRIC: CPT

## 2020-01-23 ENCOUNTER — APPOINTMENT (OUTPATIENT)
Dept: OBGYN | Facility: CLINIC | Age: 40
End: 2020-01-23
Payer: COMMERCIAL

## 2020-01-23 VITALS
SYSTOLIC BLOOD PRESSURE: 126 MMHG | DIASTOLIC BLOOD PRESSURE: 83 MMHG | WEIGHT: 160.31 LBS | HEART RATE: 91 BPM | BODY MASS INDEX: 26.71 KG/M2 | HEIGHT: 65 IN

## 2020-01-23 PROCEDURE — 0500F INITIAL PRENATAL CARE VISIT: CPT

## 2020-01-23 NOTE — PHYSICAL EXAM
[Awake] : awake [Alert] : alert [Acute Distress] : no acute distress [Mass] : no breast mass [Nipple Discharge] : no nipple discharge [Soft] : soft [Axillary LAD] : no axillary lymphadenopathy [Tender] : non tender [Oriented x3] : oriented to person, place, and time [Normal] : uterus [No Bleeding] : there was no active vaginal bleeding [Enlarged ___ wks] : enlarged [unfilled] ~Uweeks [Uterine Adnexae] : were not tender and not enlarged

## 2020-01-25 LAB
C TRACH RRNA SPEC QL NAA+PROBE: NOT DETECTED
HPV HIGH+LOW RISK DNA PNL CVX: NOT DETECTED
N GONORRHOEA RRNA SPEC QL NAA+PROBE: NOT DETECTED
SOURCE TP AMPLIFICATION: NORMAL

## 2020-01-29 LAB — CYTOLOGY CVX/VAG DOC THIN PREP: NORMAL

## 2020-02-06 ENCOUNTER — APPOINTMENT (OUTPATIENT)
Dept: ANTEPARTUM | Facility: CLINIC | Age: 40
End: 2020-02-06

## 2020-02-06 ENCOUNTER — ASOB RESULT (OUTPATIENT)
Age: 40
End: 2020-02-06

## 2020-02-06 ENCOUNTER — APPOINTMENT (OUTPATIENT)
Dept: MATERNAL FETAL MEDICINE | Facility: CLINIC | Age: 40
End: 2020-02-06
Payer: COMMERCIAL

## 2020-02-06 PROCEDURE — 99201 OFFICE OUTPATIENT NEW 10 MINUTES: CPT

## 2020-02-07 ENCOUNTER — NON-APPOINTMENT (OUTPATIENT)
Age: 40
End: 2020-02-07

## 2020-02-07 ENCOUNTER — APPOINTMENT (OUTPATIENT)
Dept: OBGYN | Facility: CLINIC | Age: 40
End: 2020-02-07
Payer: COMMERCIAL

## 2020-02-07 ENCOUNTER — LABORATORY RESULT (OUTPATIENT)
Age: 40
End: 2020-02-07

## 2020-02-07 ENCOUNTER — APPOINTMENT (OUTPATIENT)
Dept: ENDOCRINOLOGY | Facility: CLINIC | Age: 40
End: 2020-02-07

## 2020-02-07 VITALS
HEIGHT: 65 IN | DIASTOLIC BLOOD PRESSURE: 70 MMHG | BODY MASS INDEX: 26.66 KG/M2 | SYSTOLIC BLOOD PRESSURE: 100 MMHG | WEIGHT: 160 LBS

## 2020-02-07 VITALS
HEART RATE: 84 BPM | WEIGHT: 160 LBS | SYSTOLIC BLOOD PRESSURE: 103 MMHG | HEIGHT: 65 IN | DIASTOLIC BLOOD PRESSURE: 69 MMHG | OXYGEN SATURATION: 98 % | BODY MASS INDEX: 26.66 KG/M2

## 2020-02-07 LAB — HBA1C MFR BLD HPLC: 5.6

## 2020-02-07 PROCEDURE — 0501F PRENATAL FLOW SHEET: CPT

## 2020-02-08 LAB
ALBUMIN SERPL ELPH-MCNC: 4.3 G/DL
ALP BLD-CCNC: 75 U/L
ALT SERPL-CCNC: 36 U/L
ANION GAP SERPL CALC-SCNC: 17 MMOL/L
APPEARANCE: ABNORMAL
AST SERPL-CCNC: 19 U/L
BASOPHILS # BLD AUTO: 0.02 K/UL
BASOPHILS NFR BLD AUTO: 0.2 %
BILIRUB SERPL-MCNC: <0.2 MG/DL
BILIRUBIN URINE: NEGATIVE
BLOOD URINE: NEGATIVE
BUN SERPL-MCNC: 11 MG/DL
CALCIUM SERPL-MCNC: 9.5 MG/DL
CHLORIDE SERPL-SCNC: 98 MMOL/L
CO2 SERPL-SCNC: 20 MMOL/L
COLOR: YELLOW
CREAT SERPL-MCNC: 0.59 MG/DL
EOSINOPHIL # BLD AUTO: 0.05 K/UL
EOSINOPHIL NFR BLD AUTO: 0.5 %
ESTIMATED AVERAGE GLUCOSE: 126 MG/DL
GLUCOSE QUALITATIVE U: NEGATIVE
GLUCOSE SERPL-MCNC: 85 MG/DL
HBA1C MFR BLD HPLC: 6 %
HCT VFR BLD CALC: 37.9 %
HGB BLD-MCNC: 12.1 G/DL
HIV1+2 AB SPEC QL IA.RAPID: NONREACTIVE
IMM GRANULOCYTES NFR BLD AUTO: 0.2 %
KETONES URINE: NORMAL
LEUKOCYTE ESTERASE URINE: NEGATIVE
LYMPHOCYTES # BLD AUTO: 4.14 K/UL
LYMPHOCYTES NFR BLD AUTO: 45 %
MAN DIFF?: NORMAL
MCHC RBC-ENTMCNC: 28.7 PG
MCHC RBC-ENTMCNC: 31.9 GM/DL
MCV RBC AUTO: 90 FL
MONOCYTES # BLD AUTO: 0.53 K/UL
MONOCYTES NFR BLD AUTO: 5.8 %
NEUTROPHILS # BLD AUTO: 4.44 K/UL
NEUTROPHILS NFR BLD AUTO: 48.3 %
NITRITE URINE: NEGATIVE
PH URINE: 5.5
PLATELET # BLD AUTO: 296 K/UL
POTASSIUM SERPL-SCNC: 4.8 MMOL/L
PROT SERPL-MCNC: 7 G/DL
PROTEIN URINE: NORMAL
RBC # BLD: 4.21 M/UL
RBC # FLD: 13.4 %
SODIUM SERPL-SCNC: 134 MMOL/L
SPECIFIC GRAVITY URINE: 1.02
TSH SERPL-ACNC: 0.85 UIU/ML
UROBILINOGEN URINE: NORMAL
WBC # FLD AUTO: 9.2 K/UL

## 2020-02-10 ENCOUNTER — APPOINTMENT (OUTPATIENT)
Dept: MATERNAL FETAL MEDICINE | Facility: CLINIC | Age: 40
End: 2020-02-10

## 2020-02-10 LAB
CMV IGG SERPL QL: 1.1 U/ML
CMV IGG SERPL-IMP: POSITIVE
HBV SURFACE AG SER QL: NONREACTIVE
HCV AB SER QL: NONREACTIVE
HCV S/CO RATIO: 0.1 S/CO
HGB A MFR BLD: 97.3 %
HGB A2 MFR BLD: 2.7 %
HGB FRACT BLD-IMP: NORMAL
M TB IFN-G BLD-IMP: POSITIVE
MEV IGG FLD QL IA: 77.3 AU/ML
MEV IGG+IGM SER-IMP: POSITIVE
MUV AB SER-ACNC: POSITIVE
MUV IGG SER QL IA: 40.4 AU/ML
QUANTIFERON TB PLUS MITOGEN MINUS NIL: 6.97 IU/ML
QUANTIFERON TB PLUS NIL: 0.05 IU/ML
QUANTIFERON TB PLUS TB1 MINUS NIL: 6.69 IU/ML
QUANTIFERON TB PLUS TB2 MINUS NIL: 6.93 IU/ML
RUBV IGG FLD-ACNC: 23.7 INDEX
RUBV IGG SER-IMP: POSITIVE
T GONDII AB SER-IMP: NEGATIVE
T GONDII IGG SER QL: <3 IU/ML
T PALLIDUM AB SER QL IA: NEGATIVE
VZV AB TITR SER: POSITIVE
VZV IGG SER IF-ACNC: 1584 INDEX

## 2020-02-11 LAB
ABO + RH PNL BLD: NORMAL
BLD GP AB SCN SERPL QL: NORMAL
LEAD BLD-MCNC: NORMAL UG/DL

## 2020-02-12 LAB
B19V IGG SER QL IA: 1.2 INDEX
B19V IGG+IGM SER-IMP: NORMAL
B19V IGG+IGM SER-IMP: POSITIVE
B19V IGM FLD-ACNC: 0.1 INDEX
B19V IGM SER-ACNC: NEGATIVE

## 2020-02-13 ENCOUNTER — ASOB RESULT (OUTPATIENT)
Age: 40
End: 2020-02-13

## 2020-02-13 ENCOUNTER — APPOINTMENT (OUTPATIENT)
Dept: ANTEPARTUM | Facility: CLINIC | Age: 40
End: 2020-02-13
Payer: COMMERCIAL

## 2020-02-13 ENCOUNTER — APPOINTMENT (OUTPATIENT)
Dept: MATERNAL FETAL MEDICINE | Facility: CLINIC | Age: 40
End: 2020-02-13
Payer: COMMERCIAL

## 2020-02-13 VITALS
BODY MASS INDEX: 26.82 KG/M2 | RESPIRATION RATE: 18 BRPM | HEIGHT: 65 IN | WEIGHT: 161 LBS | DIASTOLIC BLOOD PRESSURE: 70 MMHG | HEART RATE: 92 BPM | SYSTOLIC BLOOD PRESSURE: 116 MMHG | OXYGEN SATURATION: 98 %

## 2020-02-13 DIAGNOSIS — Z78.9 OTHER SPECIFIED HEALTH STATUS: ICD-10-CM

## 2020-02-13 DIAGNOSIS — R93.89 ABNORMAL FINDINGS ON DIAGNOSTIC IMAGING OF OTHER SPECIFIED BODY STRUCTURES: ICD-10-CM

## 2020-02-13 LAB
AR GENE MUT ANL BLD/T: NEGATIVE
CFTR MUT TESTED BLD/T: NEGATIVE

## 2020-02-13 PROCEDURE — 99244 OFF/OP CNSLTJ NEW/EST MOD 40: CPT

## 2020-02-13 PROCEDURE — 76813 OB US NUCHAL MEAS 1 GEST: CPT

## 2020-02-13 PROCEDURE — 36416 COLLJ CAPILLARY BLOOD SPEC: CPT

## 2020-02-13 RX ORDER — CLOMIPHENE CITRATE 50 MG/1
50 TABLET ORAL
Qty: 10 | Refills: 0 | Status: DISCONTINUED | COMMUNITY
Start: 2019-07-18 | End: 2020-02-13

## 2020-02-13 RX ORDER — FOLLITROPIN 900 [IU]/1.08ML
900 INJECTION, SOLUTION SUBCUTANEOUS
Qty: 4 | Refills: 2 | Status: DISCONTINUED | COMMUNITY
Start: 2019-09-20 | End: 2020-02-13

## 2020-02-13 RX ORDER — NEEDLES, DISPOSABLE 25GX5/8"
27G X 1/2" NEEDLE, DISPOSABLE MISCELLANEOUS
Qty: 20 | Refills: 0 | Status: DISCONTINUED | COMMUNITY
Start: 2019-09-20 | End: 2020-02-13

## 2020-02-13 RX ORDER — OMEPRAZOLE 20 MG/1
20 TABLET, ORALLY DISINTEGRATING, DELAYED RELEASE ORAL
Refills: 0 | Status: DISCONTINUED | COMMUNITY
End: 2020-02-13

## 2020-02-13 RX ORDER — GANIRELIX ACETATE 250 UG/.5ML
250 INJECTION, SOLUTION SUBCUTANEOUS
Qty: 5 | Refills: 0 | Status: DISCONTINUED | COMMUNITY
Start: 2019-09-20 | End: 2020-02-13

## 2020-02-13 RX ORDER — ESTRADIOL 0.1 MG/D
0.1 PATCH, EXTENDED RELEASE TRANSDERMAL
Qty: 1 | Refills: 1 | Status: DISCONTINUED | COMMUNITY
Start: 2019-09-25 | End: 2020-02-13

## 2020-02-13 RX ORDER — DESOGESTREL AND ETHINYL ESTRADIOL 0.15-0.03
0.15-3 KIT ORAL DAILY
Qty: 28 | Refills: 2 | Status: DISCONTINUED | COMMUNITY
Start: 2019-09-10 | End: 2020-02-13

## 2020-02-13 RX ORDER — CHORIOGONADOTROPIN ALFA 250 UG/.5ML
250 INJECTION, SOLUTION SUBCUTANEOUS
Qty: 1 | Refills: 5 | Status: DISCONTINUED | COMMUNITY
Start: 2019-07-18 | End: 2020-02-13

## 2020-02-13 RX ORDER — SYRINGE WITH NEEDLE, 1 ML 25GX5/8"
22G X 1-1/2" SYRINGE, EMPTY DISPOSABLE MISCELLANEOUS
Qty: 20 | Refills: 0 | Status: DISCONTINUED | COMMUNITY
Start: 2019-09-20 | End: 2020-02-13

## 2020-02-13 RX ORDER — GONADOTROPHIN, CHORIONIC 5000 UNIT
5000 KIT INTRAMUSCULAR
Qty: 1 | Refills: 2 | Status: DISCONTINUED | COMMUNITY
Start: 2019-09-17 | End: 2020-02-13

## 2020-02-13 RX ORDER — CHORIONIC GONADOTROPIN 10000 UNIT
10000 KIT INTRAMUSCULAR
Qty: 2 | Refills: 2 | Status: DISCONTINUED | COMMUNITY
Start: 2019-09-20 | End: 2020-02-13

## 2020-02-13 RX ORDER — MENOTROPINS 75 UNIT
75 KIT SUBCUTANEOUS
Qty: 15 | Refills: 2 | Status: DISCONTINUED | COMMUNITY
Start: 2019-09-20 | End: 2020-02-13

## 2020-02-13 RX ORDER — CLOMIPHENE CITRATE 50 MG/1
50 TABLET ORAL
Qty: 10 | Refills: 0 | Status: DISCONTINUED | COMMUNITY
Start: 2019-10-03 | End: 2020-02-13

## 2020-02-13 NOTE — FAMILY HISTORY
[Reported Family History Of Birth Defects] : no congenital heart defects [Blade-Sachs Carrier] : no Blade-Sachs [Family History] : no mental retardation/autism [Reported Family History Of Genetic Disease] : no history of child defect in child of baby father [FreeTextEntry1] : Patients brother has Downs Syndrome

## 2020-02-13 NOTE — PAST MEDICAL HISTORY
[HIV Infection] : no HIV [Chlamydial Infections] : no chlamydia [Exposure To Gonorrhea] : no gonorrhea [Syphilis] : no syphilis [Herpes Simplex] : no genital herpes [Human Papilloma Virus Infection] : no genital warts [Hepatitis, B Virus] : no Hepatitis B [Hepatitis, C Virus] : no Hepatitis C [Trichomoniasis] : no trichomoniasis

## 2020-02-13 NOTE — ACTIVE PROBLEMS
[Hypertension] : no hypertension [Heart Disease] : no heart disease [Autoimmune Disease] : no autoimmune disease [Renal Disease] : no kidney disease, no UTI [Neurologic Disorder] : no neurologic disorder, no epilepsy [Psychiatric Disorders] : no psychiatric disorders [Depression] : no depression, no post partum depression [Thrombophlebitis] : no varicosities, no phlebitis [Hepatic Disorder] : no hepatitis, no liver disease [Thyroid Disorder] : no thyroid dysfunction [Trauma] : no trauma/violence [Blood Transfusion (___ Ml)] : no history of blood transfusion

## 2020-02-13 NOTE — OB HISTORY
[Reproductive Assisted] : Reproductive Assisted conception [Definite:  ___ (Date)] : the last menstrual period was [unfilled] [Normal Amount/Duration] : was of a normal amount and duration [Regular Cycle Intervals] : periods have been regular [Frequency: Q ___ days] : menstrual periods occur approximately every [unfilled] days [Menarche Age: ____] : age at menarche was [unfilled] [LMP: ___] : LMP: [unfilled] [de-identified] : conceived via IVF with fresh eggs (37 yo) with a 5 day transfer [FreeTextEntry1] : Her first prenatal visit was February 7, 2020. IVF conception. Pregnancy dated by 5 day embryo transfer. \par \par Hemoglobin A1c done on February 7, 2020 was 6.0%. \par \par She had genetic counseling on February 6, 2020 due to her being a fragile X syndrome carrier and having advanced maternal age. She declined prenatal diagnostic testing. She elected to have a noninvasive prenatal screen test which was done on February 6, 2020. The results of the noninvasive prenatal screen test are pending. She is having a first trimester screening test today. [Spotting Between  Menses] : no spotting between menses [Menstrual Cramps] : no menstrual cramps [On BCP at conception] : the patient was not on BCP at conception

## 2020-02-13 NOTE — DISCUSSION/SUMMARY
[FreeTextEntry1] : She is 11 weeks and 6 days gestation by embryo transfer dating. Her MICHAEL is 2020.\par \par I told her that pregnancies conceived through in vitro fertilization have been found to be at increased risk for birth defects,  deliveries, fetal growth restriction, and low birth weight infants. She was advised to have a fetal echocardiogram. She will be given a referral to have the echocardiogram. \par \par Regarding her advanced maternal age, she had genetic counseling.  She declined to have prenatal diagnostic testing. She's having a first trimester screening test today. The results of a noninvasive prenatal screening tests are pending. She was advised to have a detailed fetal anatomy ultrasound examination at approximately 18 -20 weeks of gestation. I told her that advanced maternal age has been associated with a higher incidence of preeclampsia /eclampsia. I also told her that advanced maternal age has been associated with an increased risk of stillbirth, and therefore, I recommend delivery during the 39 week of gestation in the event she has not given birth by 39 weeks of gestation. \par \par She was diagnosed with type 2 diabetes during .  She is being treated by Dr. Humera banks (endocrinologist). She states that her endocrinologist will not be treating her diabetes during the course of the pregnancy.  She currently takes 1000 mg of metformin in the morning and at bedtime. She performs fasting and 2 hour postprandial self glucose monitoring. She was advised to stop doing two-hour postprandial glucose determination and to start doing a one hour postprandial glucose determination from the beginning of the meal. She did not bring a glucometer or glucose log book for my review. She told me that she is not following a diabetic diet. I told her to eat three daily meals with 3 snacks to reduce postprandial glucose fluctuations. I made arrangements for her to see our diabetes educator as soon as possible (20).  \par \par She was informed that pregnancies in women with pregestational diabetes are at increased risk for miscarriage, birth defects, stillbirth, birth injury and  morbidity.  I told her that poor glucose control may cause fetal macrosomia, shoulder dystocia,  delivery,  respiratory distress syndrome and  metabolic complications such as hypoglycemia and hyperbilirubinemia. I emphasized that maintaining normal glucose levels during the course of the pregnancy has been found to decrease the risk of adverse pregnancy outcomes. She should have a baseline EKG and an ophthalmology evaluation. She told me she had an ophthalmology evaluation during 2019 and she had a EKG done during May 2019. She should also have a urine protein creatinine ratio or a 24-hour urine collection for total protein and creatinine clearance.  She was advised to have a targeted fetal anatomy ultrasound and a fetal echocardiogram between 18 to 22 weeks gestation. I recommend serial ultrasounds during the third trimester at approximately four-week intervals to evaluate fetal growth and amniotic fluid.  I recommend antepartum fetal testing with twice-weekly NST's starting at 32 weeks gestation. She told me that will be having an EKG during May 2020. I ordered a urine protein creatinine ratio. \par \par She has multiple risk factors for developing preeclampsia with the current pregnancy. She has diabetes, advanced maternal age and it is her first pregnancy. I told her that taking a baby aspirin during pregnancy has been found to reduce the risk of having preeclampsia,  delivery, and growth restricted infants. She was advised to take one baby aspirin daily.

## 2020-02-14 LAB — FMR1 GENE MUT ANL BLD/T: NORMAL

## 2020-02-17 LAB
CREAT SPEC-SCNC: 64 MG/DL
CREAT/PROT UR: 0.1 RATIO
PROT UR-MCNC: 5 MG/DL

## 2020-02-18 LAB
1ST TRIMESTER DATA: NORMAL
ADDENDUM DOC: NORMAL
AFP PNL SERPL: NORMAL
AFP SERPL-ACNC: NORMAL
CLINICAL BIOCHEMIST REVIEW: NORMAL
FREE BETA HCG 1ST TRIMESTER: NORMAL
Lab: NORMAL
NASAL BONE: PRESENT
NOTES NTD: NORMAL
NT: NORMAL
PAPP-A SERPL-ACNC: NORMAL
TRISOMY 18/3: NORMAL

## 2020-02-20 ENCOUNTER — APPOINTMENT (OUTPATIENT)
Dept: OBGYN | Facility: CLINIC | Age: 40
End: 2020-02-20
Payer: COMMERCIAL

## 2020-02-20 ENCOUNTER — NON-APPOINTMENT (OUTPATIENT)
Age: 40
End: 2020-02-20

## 2020-02-20 VITALS
SYSTOLIC BLOOD PRESSURE: 100 MMHG | BODY MASS INDEX: 26.59 KG/M2 | WEIGHT: 159.56 LBS | DIASTOLIC BLOOD PRESSURE: 60 MMHG | HEIGHT: 65 IN

## 2020-02-20 DIAGNOSIS — Z87.42 PERSONAL HISTORY OF OTHER DISEASES OF THE FEMALE GENITAL TRACT: ICD-10-CM

## 2020-02-20 DIAGNOSIS — Z87.2 PERSONAL HISTORY OF DISEASES OF THE SKIN AND SUBCUTANEOUS TISSUE: ICD-10-CM

## 2020-02-20 DIAGNOSIS — Z86.018 PERSONAL HISTORY OF OTHER BENIGN NEOPLASM: ICD-10-CM

## 2020-02-20 PROCEDURE — 0502F SUBSEQUENT PRENATAL CARE: CPT

## 2020-02-21 ENCOUNTER — APPOINTMENT (OUTPATIENT)
Dept: ANTEPARTUM | Facility: CLINIC | Age: 40
End: 2020-02-21

## 2020-02-21 ENCOUNTER — APPOINTMENT (OUTPATIENT)
Dept: MATERNAL FETAL MEDICINE | Facility: CLINIC | Age: 40
End: 2020-02-21

## 2020-02-24 ENCOUNTER — TRANSCRIPTION ENCOUNTER (OUTPATIENT)
Age: 40
End: 2020-02-24

## 2020-02-28 ENCOUNTER — ASOB RESULT (OUTPATIENT)
Age: 40
End: 2020-02-28

## 2020-02-28 ENCOUNTER — APPOINTMENT (OUTPATIENT)
Dept: MATERNAL FETAL MEDICINE | Facility: CLINIC | Age: 40
End: 2020-02-28
Payer: COMMERCIAL

## 2020-02-28 VITALS — BODY MASS INDEX: 26.39 KG/M2 | HEIGHT: 65 IN | WEIGHT: 158.38 LBS

## 2020-02-28 DIAGNOSIS — O24.111 PRE-EXISTING TYPE 2 DIABETES MELLITUS, IN PREGNANCY, FIRST TRIMESTER: ICD-10-CM

## 2020-02-28 DIAGNOSIS — Z56.0 UNEMPLOYMENT, UNSPECIFIED: ICD-10-CM

## 2020-02-28 PROCEDURE — G0108 DIAB MANAGE TRN  PER INDIV: CPT

## 2020-02-28 SDOH — ECONOMIC STABILITY - INCOME SECURITY: UNEMPLOYMENT, UNSPECIFIED: Z56.0

## 2020-03-05 ENCOUNTER — NON-APPOINTMENT (OUTPATIENT)
Age: 40
End: 2020-03-05

## 2020-03-05 ENCOUNTER — APPOINTMENT (OUTPATIENT)
Dept: OBGYN | Facility: CLINIC | Age: 40
End: 2020-03-05
Payer: COMMERCIAL

## 2020-03-05 VITALS — SYSTOLIC BLOOD PRESSURE: 124 MMHG | WEIGHT: 158 LBS | DIASTOLIC BLOOD PRESSURE: 58 MMHG | BODY MASS INDEX: 26.29 KG/M2

## 2020-03-05 PROCEDURE — 0502F SUBSEQUENT PRENATAL CARE: CPT

## 2020-03-06 ENCOUNTER — APPOINTMENT (OUTPATIENT)
Dept: INTERNAL MEDICINE | Facility: CLINIC | Age: 40
End: 2020-03-06
Payer: COMMERCIAL

## 2020-03-06 VITALS
TEMPERATURE: 97.6 F | SYSTOLIC BLOOD PRESSURE: 100 MMHG | HEART RATE: 94 BPM | HEIGHT: 65 IN | WEIGHT: 155 LBS | OXYGEN SATURATION: 99 % | DIASTOLIC BLOOD PRESSURE: 54 MMHG | BODY MASS INDEX: 25.83 KG/M2

## 2020-03-06 PROCEDURE — 99395 PREV VISIT EST AGE 18-39: CPT

## 2020-03-06 NOTE — HEALTH RISK ASSESSMENT
[Good] : ~his/her~  mood as  good [No] : No [Never (0 pts)] : Never (0 points) [0] : 2) Feeling down, depressed, or hopeless: Not at all (0) [Patient reported PAP Smear was normal] : Patient reported PAP Smear was normal [With Significant Other] : lives with significant other [Employed] : employed [] :  [# Of Children ___] : has [unfilled] children [Smoke Detector] : smoke detector [Carbon Monoxide Detector] : carbon monoxide detector [] : No [de-identified] : none [de-identified] : overall healthy [[id number]] : 0 [Reports changes in hearing] : Reports no changes in hearing [Reports changes in vision] : Reports no changes in vision [Reports changes in dental health] : Reports no changes in dental health [Guns at Home] : no guns at home [Sunscreen] : does not use sunscreen [Travel to Developing Areas] : does not  travel to developing areas [PapSmearDate] : 1/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

## 2020-03-06 NOTE — HISTORY OF PRESENT ILLNESS
[de-identified] : 39 y.o. female with h/o diabetes, PCOS, hyperlipidemia presents for CPE.  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  \par Currently 15 weeks pregnant, via IVF.  First pregnancy.  Feeling great, other than difficulty sleeping.  \par Taking 1,000mg metformin in the morning.  10 units of insulin in the evening.  Continues with endo, now seeing OB specialist.  \par Mood has been good.  \par No regular exercise.  \par Eats healthy.

## 2020-03-06 NOTE — ASSESSMENT
[FreeTextEntry1] : Currently 15 weeks pregnant:\par Continue regular OB follow up\par Encourage to maintain physical activity with 30 minutes of walking daily if tolerated\par \par Diabetes:\par Metformin + insulin while pregnant\par Endo follow up\par \par HM:\par UTD pap\par UTD tdap\par UTD flu\par drug screen needed for form\par \par s/p INH (completed 7/2017) for +PPD

## 2020-03-11 LAB
AMPHET UR-MCNC: NEGATIVE
BARBITURATES UR-MCNC: NEGATIVE
BENZODIAZ UR-MCNC: NEGATIVE
COCAINE METAB.OTHER UR-MCNC: NEGATIVE
CREATININE, URINE: 185 MG/DL
METHADONE UR-MCNC: NEGATIVE
METHAQUALONE UR-MCNC: NEGATIVE
OPIATES UR-MCNC: NEGATIVE
PCP UR-MCNC: NEGATIVE
PROPOXYPH UR QL: NEGATIVE
THC UR QL: NEGATIVE

## 2020-03-20 ENCOUNTER — APPOINTMENT (OUTPATIENT)
Dept: ANTEPARTUM | Facility: CLINIC | Age: 40
End: 2020-03-20

## 2020-03-20 ENCOUNTER — APPOINTMENT (OUTPATIENT)
Dept: MATERNAL FETAL MEDICINE | Facility: CLINIC | Age: 40
End: 2020-03-20
Payer: COMMERCIAL

## 2020-03-20 ENCOUNTER — ASOB RESULT (OUTPATIENT)
Age: 40
End: 2020-03-20

## 2020-03-20 VITALS — BODY MASS INDEX: 26.08 KG/M2 | WEIGHT: 156.56 LBS | HEIGHT: 65 IN

## 2020-03-20 PROCEDURE — G0108 DIAB MANAGE TRN  PER INDIV: CPT

## 2020-03-24 LAB
1ST TRIMESTER DATA: NORMAL
2ND TRIMESTER DATA: NORMAL
AFP PNL SERPL: NORMAL
AFP SERPL-ACNC: NORMAL
AFP SERPL-ACNC: NORMAL
B-HCG FREE SERPL-MCNC: NORMAL
CLINICAL BIOCHEMIST REVIEW: NORMAL
FREE BETA HCG 1ST TRIMESTER: NORMAL
INHIBIN A SERPL-MCNC: NORMAL
NASAL BONE: PRESENT
NOTES NTD: NORMAL
NT: NORMAL
PAPP-A SERPL-ACNC: NORMAL
U ESTRIOL SERPL-SCNC: NORMAL

## 2020-03-27 ENCOUNTER — APPOINTMENT (OUTPATIENT)
Dept: OBGYN | Facility: CLINIC | Age: 40
End: 2020-03-27
Payer: COMMERCIAL

## 2020-03-27 ENCOUNTER — NON-APPOINTMENT (OUTPATIENT)
Age: 40
End: 2020-03-27

## 2020-03-27 VITALS
HEIGHT: 65 IN | WEIGHT: 157.13 LBS | DIASTOLIC BLOOD PRESSURE: 60 MMHG | SYSTOLIC BLOOD PRESSURE: 100 MMHG | BODY MASS INDEX: 26.18 KG/M2

## 2020-03-27 PROCEDURE — 0502F SUBSEQUENT PRENATAL CARE: CPT

## 2020-04-02 ENCOUNTER — APPOINTMENT (OUTPATIENT)
Dept: ANTEPARTUM | Facility: CLINIC | Age: 40
End: 2020-04-02

## 2020-04-03 ENCOUNTER — APPOINTMENT (OUTPATIENT)
Dept: MATERNAL FETAL MEDICINE | Facility: CLINIC | Age: 40
End: 2020-04-03

## 2020-04-03 ENCOUNTER — APPOINTMENT (OUTPATIENT)
Dept: ANTEPARTUM | Facility: CLINIC | Age: 40
End: 2020-04-03

## 2020-04-17 ENCOUNTER — APPOINTMENT (OUTPATIENT)
Dept: MATERNAL FETAL MEDICINE | Facility: CLINIC | Age: 40
End: 2020-04-17
Payer: COMMERCIAL

## 2020-04-17 ENCOUNTER — NON-APPOINTMENT (OUTPATIENT)
Age: 40
End: 2020-04-17

## 2020-04-17 ENCOUNTER — ASOB RESULT (OUTPATIENT)
Age: 40
End: 2020-04-17

## 2020-04-17 ENCOUNTER — APPOINTMENT (OUTPATIENT)
Dept: OBGYN | Facility: CLINIC | Age: 40
End: 2020-04-17
Payer: COMMERCIAL

## 2020-04-17 VITALS
WEIGHT: 157.31 LBS | BODY MASS INDEX: 26.21 KG/M2 | DIASTOLIC BLOOD PRESSURE: 63 MMHG | HEIGHT: 65 IN | SYSTOLIC BLOOD PRESSURE: 101 MMHG

## 2020-04-17 LAB
BILIRUB UR QL STRIP: NORMAL
CLARITY UR: CLEAR
COLLECTION METHOD: NORMAL
GLUCOSE UR-MCNC: NORMAL
HCG UR QL: 1 EU/DL
HGB UR QL STRIP.AUTO: NORMAL
KETONES UR-MCNC: NORMAL
LEUKOCYTE ESTERASE UR QL STRIP: NORMAL
NITRITE UR QL STRIP: NORMAL
PH UR STRIP: 8.5
PROT UR STRIP-MCNC: 30
SP GR UR STRIP: 1.02

## 2020-04-17 PROCEDURE — XXXXX: CPT

## 2020-04-17 PROCEDURE — 0502F SUBSEQUENT PRENATAL CARE: CPT

## 2020-04-20 ENCOUNTER — APPOINTMENT (OUTPATIENT)
Dept: ANTEPARTUM | Facility: CLINIC | Age: 40
End: 2020-04-20

## 2020-04-20 ENCOUNTER — RX RENEWAL (OUTPATIENT)
Age: 40
End: 2020-04-20

## 2020-04-23 ENCOUNTER — APPOINTMENT (OUTPATIENT)
Dept: PEDIATRIC CARDIOLOGY | Facility: CLINIC | Age: 40
End: 2020-04-23
Payer: COMMERCIAL

## 2020-04-23 PROCEDURE — 93325 DOPPLER ECHO COLOR FLOW MAPG: CPT | Mod: 59

## 2020-04-23 PROCEDURE — 76821 MIDDLE CEREBRAL ARTERY ECHO: CPT | Mod: NC

## 2020-04-23 PROCEDURE — 76827 ECHO EXAM OF FETAL HEART: CPT

## 2020-04-23 PROCEDURE — 76820 UMBILICAL ARTERY ECHO: CPT | Mod: NC

## 2020-04-23 PROCEDURE — 76825 ECHO EXAM OF FETAL HEART: CPT

## 2020-04-23 PROCEDURE — 99203 OFFICE O/P NEW LOW 30 MIN: CPT | Mod: 25

## 2020-04-24 ENCOUNTER — APPOINTMENT (OUTPATIENT)
Dept: ANTEPARTUM | Facility: CLINIC | Age: 40
End: 2020-04-24

## 2020-04-24 ENCOUNTER — APPOINTMENT (OUTPATIENT)
Dept: MATERNAL FETAL MEDICINE | Facility: CLINIC | Age: 40
End: 2020-04-24

## 2020-04-30 ENCOUNTER — APPOINTMENT (OUTPATIENT)
Dept: ANTEPARTUM | Facility: CLINIC | Age: 40
End: 2020-04-30

## 2020-04-30 ENCOUNTER — ASOB RESULT (OUTPATIENT)
Age: 40
End: 2020-04-30

## 2020-04-30 ENCOUNTER — APPOINTMENT (OUTPATIENT)
Dept: MATERNAL FETAL MEDICINE | Facility: CLINIC | Age: 40
End: 2020-04-30
Payer: COMMERCIAL

## 2020-04-30 ENCOUNTER — APPOINTMENT (OUTPATIENT)
Dept: ANTEPARTUM | Facility: CLINIC | Age: 40
End: 2020-04-30
Payer: COMMERCIAL

## 2020-04-30 VITALS
SYSTOLIC BLOOD PRESSURE: 110 MMHG | HEART RATE: 92 BPM | WEIGHT: 161.5 LBS | DIASTOLIC BLOOD PRESSURE: 62 MMHG | HEIGHT: 65 IN | OXYGEN SATURATION: 98 % | BODY MASS INDEX: 26.91 KG/M2

## 2020-04-30 VITALS — TEMPERATURE: 98.9 F

## 2020-04-30 PROCEDURE — 76811 OB US DETAILED SNGL FETUS: CPT

## 2020-04-30 PROCEDURE — 99242 OFF/OP CONSLTJ NEW/EST SF 20: CPT

## 2020-04-30 NOTE — DISCUSSION/SUMMARY
[FreeTextEntry1] : Please see the previous consultations for the patient's medical and obstetrical history. She is currently 23 weeks pregnant and is being seen for advanced maternal age and type 2 diabetes. She is using 12 units of NPH insulin at bedtime and 1000 mg metformin in the morning. Genetic and nutritional counseling were sent under a separate cover.\par \par Evaluation of her diabetic flow sheets reveals good control of fasting and postprandial blood sugars. There are some elevated fasting and postprandial blood sugars, but not significant. A comprehensive ultrasound was ordered today which reveals a single viable intrauterine gestation with size consistent with dates. No gross or soft markers assessment with fetal aneuploidy were noted. Vital signs today reveal a blood pressure of 110/62, maternal weight is 161.8 pounds which is a 4 pound weight gain from the evaluation done on . This is consistent with a BMI of 26.88KG.\par \par Management of the ongoing pregnancy was discussed. The patient will continue on her current medications and no changes are indicated at this time. Hemoglobin A1c was 6.0% in February and a repeat blood test was drawn today. The patient was advised to have an ophthalmological evaluation and a 24-hour urine collection should be performed in your office to evaluate renal function. Fetal echo has been performed and is within normal limits. Possible problems and complications related to a diabetic pregnancy including fetal macrosomia, increased risk for operative vaginal delivery and  section and possible  delivery with its associated morbidities and mortalities were discussed. She will return in 6 weeks for a growth scan and maternal fetal medicine consultation. She has a dietary consultation scheduled in 2 weeks. She is a Restoration and at her next consultation we will discuss the Restoration protical and have.  Her sign the appropriate papers. All of the above was discussed with the patient and all questions were answered.\par \par I spent a total of 30 minutes of which greater than 50% was counseling and coordinating care.\par \par Recommendations;\par \par #1. Continue current ADA diet and home glucose monitoring.\par #2. Continue 12 units of NPH insulin at bedtime and 1000 mg of metformin in the morning.\par #3. Hemoglobin A1c obtained today.\par #4. Dietary consultation in 2 weeks is scheduled.\par #5. Growth scan and followup maternal fetal medicine consultation in 6 weeks.\par #6. Ophthalmologic evaluation is recommended.\par #7. 24 hour urine collection is recommended.

## 2020-05-01 LAB
ESTIMATED AVERAGE GLUCOSE: 100 MG/DL
HBA1C MFR BLD HPLC: 5.1 %

## 2020-05-08 ENCOUNTER — APPOINTMENT (OUTPATIENT)
Dept: MATERNAL FETAL MEDICINE | Facility: CLINIC | Age: 40
End: 2020-05-08
Payer: COMMERCIAL

## 2020-05-08 ENCOUNTER — ASOB RESULT (OUTPATIENT)
Age: 40
End: 2020-05-08

## 2020-05-08 VITALS — WEIGHT: 161 LBS | BODY MASS INDEX: 26.82 KG/M2 | HEIGHT: 65 IN

## 2020-05-08 PROCEDURE — XXXXX: CPT

## 2020-05-14 ENCOUNTER — APPOINTMENT (OUTPATIENT)
Dept: INTERNAL MEDICINE | Facility: CLINIC | Age: 40
End: 2020-05-14

## 2020-05-15 ENCOUNTER — APPOINTMENT (OUTPATIENT)
Dept: OBGYN | Facility: CLINIC | Age: 40
End: 2020-05-15
Payer: COMMERCIAL

## 2020-05-15 ENCOUNTER — NON-APPOINTMENT (OUTPATIENT)
Age: 40
End: 2020-05-15

## 2020-05-15 VITALS
DIASTOLIC BLOOD PRESSURE: 62 MMHG | WEIGHT: 161.19 LBS | HEIGHT: 65 IN | BODY MASS INDEX: 26.85 KG/M2 | SYSTOLIC BLOOD PRESSURE: 101 MMHG | HEART RATE: 92 BPM

## 2020-05-15 PROCEDURE — 0502F SUBSEQUENT PRENATAL CARE: CPT

## 2020-05-27 ENCOUNTER — OUTPATIENT (OUTPATIENT)
Dept: OUTPATIENT SERVICES | Facility: HOSPITAL | Age: 40
LOS: 1 days | End: 2020-05-27
Payer: COMMERCIAL

## 2020-05-27 VITALS
HEART RATE: 94 BPM | SYSTOLIC BLOOD PRESSURE: 109 MMHG | DIASTOLIC BLOOD PRESSURE: 65 MMHG | TEMPERATURE: 98 F | RESPIRATION RATE: 14 BRPM

## 2020-05-27 VITALS — HEART RATE: 94 BPM | SYSTOLIC BLOOD PRESSURE: 109 MMHG | DIASTOLIC BLOOD PRESSURE: 65 MMHG

## 2020-05-27 DIAGNOSIS — N91.1 SECONDARY AMENORRHEA: ICD-10-CM

## 2020-05-27 DIAGNOSIS — Z92.89 PERSONAL HISTORY OF OTHER MEDICAL TREATMENT: ICD-10-CM

## 2020-05-27 DIAGNOSIS — B37.3 CANDIDIASIS OF VULVA AND VAGINA: ICD-10-CM

## 2020-05-27 DIAGNOSIS — Z92.29 PERSONAL HISTORY OF OTHER DRUG THERAPY: ICD-10-CM

## 2020-05-27 DIAGNOSIS — R00.2 PALPITATIONS: ICD-10-CM

## 2020-05-27 DIAGNOSIS — Z13.71 ENCOUNTER FOR NONPROCREATIVE SCREENING FOR GENETIC DISEASE CARRIER STATUS: ICD-10-CM

## 2020-05-27 DIAGNOSIS — Z86.39 PERSONAL HISTORY OF OTHER ENDOCRINE, NUTRITIONAL AND METABOLIC DISEASE: ICD-10-CM

## 2020-05-27 DIAGNOSIS — L85.2 KERATOSIS PUNCTATA (PALMARIS ET PLANTARIS): ICD-10-CM

## 2020-05-27 DIAGNOSIS — O47.02 FALSE LABOR BEFORE 37 COMPLETED WEEKS OF GESTATION, SECOND TRIMESTER: ICD-10-CM

## 2020-05-27 DIAGNOSIS — Z01.419 ENCOUNTER FOR GYNECOLOGICAL EXAMINATION (GENERAL) (ROUTINE) W/OUT ABNORMAL FINDINGS: ICD-10-CM

## 2020-05-27 DIAGNOSIS — Z01.84 ENCOUNTER FOR ANTIBODY RESPONSE EXAMINATION: ICD-10-CM

## 2020-05-27 LAB
APPEARANCE UR: CLEAR — SIGNIFICANT CHANGE UP
BACTERIA # UR AUTO: NEGATIVE — SIGNIFICANT CHANGE UP
BASOPHILS # BLD AUTO: 0.04 K/UL — SIGNIFICANT CHANGE UP (ref 0–0.2)
BASOPHILS NFR BLD AUTO: 0.4 % — SIGNIFICANT CHANGE UP (ref 0–2)
BILIRUB UR-MCNC: NEGATIVE — SIGNIFICANT CHANGE UP
BLD GP AB SCN SERPL QL: SIGNIFICANT CHANGE UP
COLOR SPEC: YELLOW — SIGNIFICANT CHANGE UP
DIFF PNL FLD: NEGATIVE — SIGNIFICANT CHANGE UP
EOSINOPHIL # BLD AUTO: 0.02 K/UL — SIGNIFICANT CHANGE UP (ref 0–0.5)
EOSINOPHIL NFR BLD AUTO: 0.2 % — SIGNIFICANT CHANGE UP (ref 0–6)
EPI CELLS # UR: SIGNIFICANT CHANGE UP
GLUCOSE UR QL: NEGATIVE MG/DL — SIGNIFICANT CHANGE UP
HCT VFR BLD CALC: 34.3 % — LOW (ref 34.5–45)
HGB BLD-MCNC: 11.3 G/DL — LOW (ref 11.5–15.5)
IMM GRANULOCYTES NFR BLD AUTO: 0.6 % — SIGNIFICANT CHANGE UP (ref 0–1.5)
KETONES UR-MCNC: NEGATIVE — SIGNIFICANT CHANGE UP
LEUKOCYTE ESTERASE UR-ACNC: NEGATIVE — SIGNIFICANT CHANGE UP
LYMPHOCYTES # BLD AUTO: 2.07 K/UL — SIGNIFICANT CHANGE UP (ref 1–3.3)
LYMPHOCYTES # BLD AUTO: 22.2 % — SIGNIFICANT CHANGE UP (ref 13–44)
MCHC RBC-ENTMCNC: 31 PG — SIGNIFICANT CHANGE UP (ref 27–34)
MCHC RBC-ENTMCNC: 32.9 GM/DL — SIGNIFICANT CHANGE UP (ref 32–36)
MCV RBC AUTO: 94 FL — SIGNIFICANT CHANGE UP (ref 80–100)
MONOCYTES # BLD AUTO: 0.74 K/UL — SIGNIFICANT CHANGE UP (ref 0–0.9)
MONOCYTES NFR BLD AUTO: 7.9 % — SIGNIFICANT CHANGE UP (ref 2–14)
NEUTROPHILS # BLD AUTO: 6.4 K/UL — SIGNIFICANT CHANGE UP (ref 1.8–7.4)
NEUTROPHILS NFR BLD AUTO: 68.7 % — SIGNIFICANT CHANGE UP (ref 43–77)
NITRITE UR-MCNC: NEGATIVE — SIGNIFICANT CHANGE UP
PH UR: 7 — SIGNIFICANT CHANGE UP (ref 5–8)
PLATELET # BLD AUTO: 249 K/UL — SIGNIFICANT CHANGE UP (ref 150–400)
PROT UR-MCNC: NEGATIVE MG/DL — SIGNIFICANT CHANGE UP
RBC # BLD: 3.65 M/UL — LOW (ref 3.8–5.2)
RBC # FLD: 13.2 % — SIGNIFICANT CHANGE UP (ref 10.3–14.5)
RBC CASTS # UR COMP ASSIST: NEGATIVE /HPF — SIGNIFICANT CHANGE UP (ref 0–4)
SP GR SPEC: 1 — LOW (ref 1.01–1.02)
UROBILINOGEN FLD QL: NEGATIVE MG/DL — SIGNIFICANT CHANGE UP
WBC # BLD: 9.33 K/UL — SIGNIFICANT CHANGE UP (ref 3.8–10.5)
WBC # FLD AUTO: 9.33 K/UL — SIGNIFICANT CHANGE UP (ref 3.8–10.5)
WBC UR QL: SIGNIFICANT CHANGE UP

## 2020-05-27 PROCEDURE — 86901 BLOOD TYPING SEROLOGIC RH(D): CPT

## 2020-05-27 PROCEDURE — G0463: CPT

## 2020-05-27 PROCEDURE — 85027 COMPLETE CBC AUTOMATED: CPT

## 2020-05-27 PROCEDURE — 99214 OFFICE O/P EST MOD 30 MIN: CPT

## 2020-05-27 PROCEDURE — 59025 FETAL NON-STRESS TEST: CPT

## 2020-05-27 PROCEDURE — 36415 COLL VENOUS BLD VENIPUNCTURE: CPT

## 2020-05-27 PROCEDURE — 86850 RBC ANTIBODY SCREEN: CPT

## 2020-05-27 PROCEDURE — 86900 BLOOD TYPING SEROLOGIC ABO: CPT

## 2020-05-27 PROCEDURE — 81001 URINALYSIS AUTO W/SCOPE: CPT

## 2020-05-27 NOTE — OB PROVIDER TRIAGE NOTE - HISTORY OF PRESENT ILLNESS
39 year old female  at 26 and 5/7 weeks EDC 2020 presents for vaginal spotting that started yesterday.  Patient states that she has spotting at 1 pm yesterday when she wiped.  She was not concerned as it did not happen again.  States again this morning she had spotting when she wiped.  States the blood is dark in color.  No active vaginal bleeding.  She admits to good FM.  Denies LOF and ctx.  She denies urinary frequency, urgency or dysuria.  She denies recent intercourse or abdominal trauma.      PMH: DM2  PSH: Denies  Allergies: NKDA  Meds: pnv, vitamin D, iron, metformin, insulin 14 units q HS  Social history: Negative x 3  OB history: G1 - current pregnancy, management of pregestational DM

## 2020-05-27 NOTE — OB RN TRIAGE NOTE - LIVING CHILDREN, OB PROFILE
Gillian is calling stating they did a resp panel for allergens and there was not enough sample when they ran it twice. Gillian stated if it still needs to be done than it will have to be recollected.     Patient Name: Florencio JANET Mcneill  Caller Name: Gillian  Name of Facility: [facility]  Callback Number: [phone] option 4 than 3  Did you confirm the message with the caller?: yes    Thank you,  Kristi Bautista    
Spoke with [facility] staff.  They will reach out to the lab that was servicing patient to have them call patient to have this redrawn and patient should not be charged for this.  A new order does not need to be placed since there is already one in place.  
0

## 2020-05-27 NOTE — OB RN TRIAGE NOTE - PMH
GDM (gestational diabetes mellitus)  Metformin in the am, insulin at night Type 2 diabetes mellitus  Metformin in am, insulin at night

## 2020-05-27 NOTE — OB PROVIDER TRIAGE NOTE - NSHPPHYSICALEXAM_GEN_ALL_CORE
General: NAD  Abdomen: soft, gravid, non-tender  SSE: Cervix visually closed, scant amount of dark blood mixed with white discharge, no active bleeding, cervix L/T/C    FH: 155  TOCO: No ctx

## 2020-05-27 NOTE — OB PROVIDER TRIAGE NOTE - NSOBPROVIDERNOTE_OBGYN_ALL_OB_FT
39 year old female  at 26 and 5/7 weeks with vaginal spotting.      1. SSE done - cervix closed, scant dark blood in vagina  2. UA, CBC, type and screen ordered  3. Continuous toco  4. Will monitor    Ilana Vidal DO

## 2020-05-29 ENCOUNTER — APPOINTMENT (OUTPATIENT)
Dept: MATERNAL FETAL MEDICINE | Facility: CLINIC | Age: 40
End: 2020-05-29
Payer: COMMERCIAL

## 2020-05-29 ENCOUNTER — ASOB RESULT (OUTPATIENT)
Age: 40
End: 2020-05-29

## 2020-05-29 VITALS — HEIGHT: 65 IN | WEIGHT: 160 LBS | BODY MASS INDEX: 26.66 KG/M2

## 2020-05-29 PROBLEM — E11.9 TYPE 2 DIABETES MELLITUS WITHOUT COMPLICATIONS: Chronic | Status: ACTIVE | Noted: 2020-05-27

## 2020-05-29 PROCEDURE — XXXXX: CPT

## 2020-05-29 RX ORDER — SYRING-NEEDL,DISP,INSUL,0.3 ML 31 GX5/16"
31G X 5/16" SYRINGE, EMPTY DISPOSABLE MISCELLANEOUS
Qty: 1 | Refills: 3 | Status: DISCONTINUED | COMMUNITY
Start: 2020-02-28 | End: 2020-05-29

## 2020-06-05 ENCOUNTER — APPOINTMENT (OUTPATIENT)
Dept: MATERNAL FETAL MEDICINE | Facility: CLINIC | Age: 40
End: 2020-06-05
Payer: COMMERCIAL

## 2020-06-05 ENCOUNTER — ASOB RESULT (OUTPATIENT)
Age: 40
End: 2020-06-05

## 2020-06-05 ENCOUNTER — APPOINTMENT (OUTPATIENT)
Dept: OBGYN | Facility: CLINIC | Age: 40
End: 2020-06-05
Payer: COMMERCIAL

## 2020-06-05 ENCOUNTER — NON-APPOINTMENT (OUTPATIENT)
Age: 40
End: 2020-06-05

## 2020-06-05 VITALS — DIASTOLIC BLOOD PRESSURE: 64 MMHG | SYSTOLIC BLOOD PRESSURE: 93 MMHG | WEIGHT: 163.44 LBS | BODY MASS INDEX: 27.2 KG/M2

## 2020-06-05 PROCEDURE — XXXXX: CPT

## 2020-06-05 PROCEDURE — 0502F SUBSEQUENT PRENATAL CARE: CPT

## 2020-06-05 RX ORDER — BLOOD-GLUCOSE METER
W/DEVICE EACH MISCELLANEOUS
Qty: 1 | Refills: 0 | Status: ACTIVE | COMMUNITY
Start: 2017-02-15 | End: 1900-01-01

## 2020-06-08 LAB
BASOPHILS # BLD AUTO: 0.03 K/UL
BASOPHILS NFR BLD AUTO: 0.3 %
EOSINOPHIL # BLD AUTO: 0.03 K/UL
EOSINOPHIL NFR BLD AUTO: 0.3 %
HCT VFR BLD CALC: 37.5 %
HGB BLD-MCNC: 11.6 G/DL
IMM GRANULOCYTES NFR BLD AUTO: 0.7 %
LYMPHOCYTES # BLD AUTO: 2.89 K/UL
LYMPHOCYTES NFR BLD AUTO: 26.3 %
MAN DIFF?: NORMAL
MCHC RBC-ENTMCNC: 30.6 PG
MCHC RBC-ENTMCNC: 30.9 GM/DL
MCV RBC AUTO: 98.9 FL
MONOCYTES # BLD AUTO: 0.77 K/UL
MONOCYTES NFR BLD AUTO: 7 %
NEUTROPHILS # BLD AUTO: 7.19 K/UL
NEUTROPHILS NFR BLD AUTO: 65.4 %
PLATELET # BLD AUTO: 255 K/UL
RBC # BLD: 3.79 M/UL
RBC # FLD: 14.2 %
WBC # FLD AUTO: 10.99 K/UL

## 2020-06-12 ENCOUNTER — ASOB RESULT (OUTPATIENT)
Age: 40
End: 2020-06-12

## 2020-06-12 ENCOUNTER — APPOINTMENT (OUTPATIENT)
Dept: ANTEPARTUM | Facility: CLINIC | Age: 40
End: 2020-06-12
Payer: COMMERCIAL

## 2020-06-12 ENCOUNTER — APPOINTMENT (OUTPATIENT)
Dept: MATERNAL FETAL MEDICINE | Facility: CLINIC | Age: 40
End: 2020-06-12
Payer: COMMERCIAL

## 2020-06-12 VITALS
SYSTOLIC BLOOD PRESSURE: 90 MMHG | BODY MASS INDEX: 27.03 KG/M2 | OXYGEN SATURATION: 98 % | HEART RATE: 92 BPM | DIASTOLIC BLOOD PRESSURE: 50 MMHG | HEIGHT: 65 IN | WEIGHT: 162.25 LBS | RESPIRATION RATE: 18 BRPM

## 2020-06-12 VITALS
HEART RATE: 92 BPM | BODY MASS INDEX: 27.03 KG/M2 | HEIGHT: 65 IN | OXYGEN SATURATION: 98 % | SYSTOLIC BLOOD PRESSURE: 90 MMHG | WEIGHT: 162.25 LBS | DIASTOLIC BLOOD PRESSURE: 50 MMHG | RESPIRATION RATE: 18 BRPM

## 2020-06-12 DIAGNOSIS — Z53.1 PROCEDURE AND TREATMENT NOT CARRIED OUT BECAUSE OF PATIENT'S DECISION FOR REASONS OF BELIEF AND GROUP PRESSURE: ICD-10-CM

## 2020-06-12 PROCEDURE — 76820 UMBILICAL ARTERY ECHO: CPT

## 2020-06-12 PROCEDURE — 93976 VASCULAR STUDY: CPT

## 2020-06-12 PROCEDURE — 99215 OFFICE O/P EST HI 40 MIN: CPT

## 2020-06-12 PROCEDURE — 76816 OB US FOLLOW-UP PER FETUS: CPT

## 2020-06-12 NOTE — DISCUSSION/SUMMARY
[FreeTextEntry1] : She is 29 weeks and 0 days gestation by embryo transfer dating. Her MICHAEL is 2020.\par \par She is a [employer]. She has indicated that she has specific requests with regard to the acceptance or refusal of blood products during her upcoming hospitalization for labor and delivery. She does not have a known bleeding disorder. She denies having a bleeding event in the past where physicians suggested a blood transfusion.\par \par I discussed the Blood Product Preference form with the patient.  She outlined her wishes with regard to which blood products and derivatives she will or will not accept should the need arise during her hospitalization.  She understands the risk of refusing blood products and understands that blood alternatives and derivatives may not provide the same maternal benefit as a direct blood product. She indicated that she desires to place her life at risk and potentially die, rather than accept a product which is on her "will not accept" list.  I recommend a consultation with [facility] obstetrical Anesthesia, and Hematology providers. I told her that she will be given Tranexamic acid for postpartum hemorrhage prophylaxis before a  delivery or after a vaginal delivery. She was in agreement with my recommendation for prophylactic treatment. She was given a copy of the Blood Product Preference form that she signed. \par \par Regarding her type 2 diabetes, she currently takes 1000 mg of metformin in the morning and 24 units of NPH insulin at bedtime. A hemoglobin A1c level done on 2020 was 5.1%. She performs fasting and 1 hour postprandial self glucose monitoring.  My review of her glucose diary from 20 to 20 reveals normal fasting glucose readings the last 3 days, after she increased the bedtime NPH insulin to 24 units on 20.  She has 13 elevated postprandial glucose readings from not following the recommended diet. She told me that she is trying to follow a diabetic diet and is eating three daily meals with 3 snacks. She has not been keeping a food diary. I gave her a copy of our food /glucose diary forms for her to write what she is eating each day. I told her that she needs additional diet education and it is important for her to write what she eats so that we can provide proper dietary counseling. She was advised to bring her food diary for her visits with the diabetes educator and the maternal fetal medicine physician. She has been scheduled to see our diabetes educator on 20. She was advised to call our diabetes educator sooner if she has questions regarding her insulin dose and dietary food choices.  She was scheduled to have a followup MFM consultation in 3 weeks.\par \par I again told her that poor glucose control may cause  delivery,  respiratory distress syndrome and  metabolic complications such as hypoglycemia and hyperbilirubinemia. I emphasized that maintaining normal glucose levels during the course of the pregnancy has been found to decrease the risk of adverse pregnancy outcomes.  I recommend antepartum fetal testing with twice-weekly NST's starting at 32 weeks gestation. \par \par She is taking a baby aspirin daily to reduce the risk of having preeclampsia,  delivery, and growth restricted infants. I told her that today's ultrasound examination and reported the estimated fetal weight to be 2 lbs. 8 oz. or 1947 g which is at the 10th percentile for gestational age. She was scheduled to have a followup ultrasound examination in 3 weeks to evaluate fetal growth.

## 2020-06-12 NOTE — OB HISTORY
[LMP: ___] : LMP: [unfilled] [Reproductive Assisted] : Reproductive Assisted conception [Definite:  ___ (Date)] : the last menstrual period was [unfilled] [Normal Amount/Duration] : was of a normal amount and duration [Regular Cycle Intervals] : periods have been regular [Frequency: Q ___ days] : menstrual periods occur approximately every [unfilled] days [Menarche Age: ____] : age at menarche was [unfilled] [de-identified] : conceived via IVF with fresh eggs (39 yo) and a 5 day embryo transfer [FreeTextEntry1] : Her first prenatal visit was February 7, 2020. IVF conception. Pregnancy dated by 5 day embryo transfer. \par \par Hemoglobin A1c done on February 7, 2020 was 6.0%. \par \par She had genetic counseling on February 6, 2020 due to her being a fragile X syndrome carrier and having advanced maternal age. She declined prenatal diagnostic testing. She elected to have a noninvasive prenatal screen test which was done on February 6, 2020. The results of the noninvasive prenatal screen test are pending. She is having a first trimester screening test today.\par \par She had a maternal fetal medicine consultation with me during February 13, 2020 for her type 2 diabetes, IVF conception, and advanced maternal age. She had a followup MFM consult with Dr. Sabillon on April 30, 2024 for her type 2 diabetes. She has had followup visits with our diabetes educator on May 8, May 29, and June 5, 2020. On May 8, 2020 she was started on 12 units of NPH insulin at bedtime. On May 29, 2020 the bedtime NPH insulin was increased to 20 units. [Menstrual Cramps] : no menstrual cramps [Spotting Between  Menses] : no spotting between menses [On BCP at conception] : the patient was not on BCP at conception

## 2020-06-12 NOTE — ACTIVE PROBLEMS
[Hypertension] : no hypertension [Heart Disease] : no heart disease [Autoimmune Disease] : no autoimmune disease [Renal Disease] : no kidney disease, no UTI [Neurologic Disorder] : no neurologic disorder, no epilepsy [Psychiatric Disorders] : no psychiatric disorders [Depression] : no depression, no post partum depression [Hepatic Disorder] : no hepatitis, no liver disease [Thrombophlebitis] : no varicosities, no phlebitis [Thyroid Disorder] : no thyroid dysfunction [Trauma] : no trauma/violence [Blood Transfusion (___ Ml)] : no history of blood transfusion

## 2020-06-17 LAB
CREAT SPEC-SCNC: 78 MG/DL
CREAT/PROT UR: 0.1 RATIO
PROT UR-MCNC: 7 MG/DL

## 2020-06-26 ENCOUNTER — APPOINTMENT (OUTPATIENT)
Dept: OBGYN | Facility: CLINIC | Age: 40
End: 2020-06-26
Payer: COMMERCIAL

## 2020-06-26 ENCOUNTER — NON-APPOINTMENT (OUTPATIENT)
Age: 40
End: 2020-06-26

## 2020-06-26 ENCOUNTER — ASOB RESULT (OUTPATIENT)
Age: 40
End: 2020-06-26

## 2020-06-26 ENCOUNTER — APPOINTMENT (OUTPATIENT)
Dept: MATERNAL FETAL MEDICINE | Facility: CLINIC | Age: 40
End: 2020-06-26
Payer: COMMERCIAL

## 2020-06-26 VITALS
DIASTOLIC BLOOD PRESSURE: 68 MMHG | WEIGHT: 165.13 LBS | BODY MASS INDEX: 27.48 KG/M2 | SYSTOLIC BLOOD PRESSURE: 105 MMHG

## 2020-06-26 VITALS — HEIGHT: 65 IN | WEIGHT: 165.13 LBS | BODY MASS INDEX: 27.51 KG/M2

## 2020-06-26 PROCEDURE — 0502F SUBSEQUENT PRENATAL CARE: CPT

## 2020-06-26 PROCEDURE — XXXXX: CPT

## 2020-06-26 PROCEDURE — G0108 DIAB MANAGE TRN  PER INDIV: CPT | Mod: 95

## 2020-06-29 ENCOUNTER — RX RENEWAL (OUTPATIENT)
Age: 40
End: 2020-06-29

## 2020-06-30 ENCOUNTER — RX RENEWAL (OUTPATIENT)
Age: 40
End: 2020-06-30

## 2020-06-30 RX ORDER — ISOPROPYL ALCOHOL 0.7 ML/ML
SWAB TOPICAL
Qty: 1 | Refills: 1 | Status: ACTIVE | COMMUNITY
Start: 2020-02-28 | End: 1900-01-01

## 2020-07-06 ENCOUNTER — APPOINTMENT (OUTPATIENT)
Dept: ANTEPARTUM | Facility: CLINIC | Age: 40
End: 2020-07-06
Payer: COMMERCIAL

## 2020-07-06 ENCOUNTER — ASOB RESULT (OUTPATIENT)
Age: 40
End: 2020-07-06

## 2020-07-06 ENCOUNTER — APPOINTMENT (OUTPATIENT)
Dept: MATERNAL FETAL MEDICINE | Facility: CLINIC | Age: 40
End: 2020-07-06
Payer: COMMERCIAL

## 2020-07-06 VITALS
DIASTOLIC BLOOD PRESSURE: 62 MMHG | HEART RATE: 81 BPM | OXYGEN SATURATION: 98 % | SYSTOLIC BLOOD PRESSURE: 100 MMHG | RESPIRATION RATE: 17 BRPM | HEIGHT: 65 IN | BODY MASS INDEX: 27.86 KG/M2 | WEIGHT: 167.25 LBS

## 2020-07-06 PROCEDURE — 99214 OFFICE O/P EST MOD 30 MIN: CPT

## 2020-07-06 PROCEDURE — 93976 VASCULAR STUDY: CPT

## 2020-07-06 PROCEDURE — 76816 OB US FOLLOW-UP PER FETUS: CPT

## 2020-07-06 PROCEDURE — 76821 MIDDLE CEREBRAL ARTERY ECHO: CPT

## 2020-07-06 PROCEDURE — 76819 FETAL BIOPHYS PROFIL W/O NST: CPT

## 2020-07-06 PROCEDURE — 76820 UMBILICAL ARTERY ECHO: CPT

## 2020-07-06 NOTE — ACTIVE PROBLEMS
[Hypertension] : no hypertension [Autoimmune Disease] : no autoimmune disease [Heart Disease] : no heart disease [Renal Disease] : no kidney disease, no UTI [Psychiatric Disorders] : no psychiatric disorders [Neurologic Disorder] : no neurologic disorder, no epilepsy [Depression] : no depression, no post partum depression [Hepatic Disorder] : no hepatitis, no liver disease [Thrombophlebitis] : no varicosities, no phlebitis [Thyroid Disorder] : no thyroid dysfunction [Blood Transfusion (___ Ml)] : no history of blood transfusion [Trauma] : no trauma/violence

## 2020-07-06 NOTE — DISCUSSION/SUMMARY
[FreeTextEntry1] : She is 32 weeks and 3 days gestation by embryo transfer dating. Her MICHAEL is 2020.\par \par Regarding her type 2 diabetes, she is taking 1000 mg of metformin in the morning, and 30 units of NPH insulin at bedtime. She is also taking rapid acting NovoLog insulin 5 units before breakfast, lunch, and dinner.  She performs fasting and 1 hour postprandial self glucose monitoring.  My review of her glucose diary from 20 to 20 reveals 5/11 (45%) elevated fasting glucose readings. She also had 14/33 (42%) elevated postprandial glucose readings most likely from not following the recommended diet. She told me that she is doing her best to follow a diabetic diet and is eating three daily meals with 3 snacks. She is now keeping a food diary.  I told her that she needs additional diet education. She was scheduled to see our diabetes educator this week. She was advised to call our diabetes educator sooner if she has questions regarding her insulin dose and dietary food choices. I advised her to increase the rapid acting insulin before dinner to 7 units.  I ordered a hemoglobin A1c level. She was scheduled to have a followup MFM consultation in 4 weeks.\par \par I again told her that poor glucose control may cause  delivery,  respiratory distress syndrome and  metabolic complications such as hypoglycemia and hyperbilirubinemia. I emphasized that maintaining normal glucose levels during the course of the pregnancy has been found to decrease the risk of adverse pregnancy outcomes. She was scheduled to have antepartum fetal testing twice-weekly.  \par \par She is taking a baby aspirin daily to reduce the risk of having preeclampsia,  delivery, and growth restricted infants. I told her that today's ultrasound examination reported the estimated fetal weight to be 3 lbs. 14 oz. or 1748 g which is at the 14th percentile for gestational age. She was scheduled to have a followup ultrasound examination in 4 weeks to evaluate fetal growth.

## 2020-07-06 NOTE — PAST MEDICAL HISTORY
[HIV Infection] : no HIV [Chlamydial Infections] : no chlamydia [Exposure To Gonorrhea] : no gonorrhea [Syphilis] : no syphilis [Hepatitis, B Virus] : no Hepatitis B [Human Papilloma Virus Infection] : no genital warts [Herpes Simplex] : no genital herpes [Hepatitis, C Virus] : no Hepatitis C [Trichomoniasis] : no trichomoniasis

## 2020-07-06 NOTE — CHIEF COMPLAINT
[G ___] : G [unfilled] [P ___] : P [unfilled] [de-identified] : refusal of blood products because she is a Restorationism

## 2020-07-06 NOTE — OB HISTORY
[LMP: ___] : LMP: [unfilled] [Reproductive Assisted] : Reproductive Assisted conception [Definite:  ___ (Date)] : the last menstrual period was [unfilled] [Normal Amount/Duration] : was of a normal amount and duration [Frequency: Q ___ days] : menstrual periods occur approximately every [unfilled] days [Regular Cycle Intervals] : periods have been regular [Menarche Age: ____] : age at menarche was [unfilled] [de-identified] : conceived via IVF with fresh eggs (37 yo) and a 5 day embryo transfer [FreeTextEntry1] : Her first prenatal visit was February 7, 2020. IVF conception. Pregnancy dated by 5 day embryo transfer. \par \par Hemoglobin A1c done on February 7, 2020 was 6.0%. Hemoglobin A1c level done on April 30, 2020 was 5.1%.\par \par She had genetic counseling on February 6, 2020 due to her being a fragile X syndrome carrier and having advanced maternal age. She declined prenatal diagnostic testing. She elected to have a noninvasive prenatal screen test which was done on February 6, 2020. The results of the noninvasive prenatal screen were WNL. The fetal sex was reported to be female. She had a normal first trimester screening test and a normal sequential screening test.\par \par She had a maternal fetal medicine consultation with me during February 13, 2020 for her type 2 diabetes, IVF conception, and advanced maternal age. She had a followup MFM consult with Dr. Sabillon on April 30, 2024 for her type 2 diabetes. She has had followup visits with our diabetes educator on May 8, May 29, and June 5, 2020. On May 8, 2020 she was started on 12 units of NPH insulin at bedtime. On May 29, 2020 the bedtime NPH insulin was increased to 20 units. \par \par She had a followup maternal fetal medicine visit with me on June 12, 2020 due to her refusal to take blood products during labor and delivery because she is a [employer]. [Spotting Between  Menses] : no spotting between menses [Menstrual Cramps] : no menstrual cramps [On BCP at conception] : the patient was not on BCP at conception

## 2020-07-07 LAB
ESTIMATED AVERAGE GLUCOSE: 111 MG/DL
HBA1C MFR BLD HPLC: 5.5 %

## 2020-07-10 ENCOUNTER — APPOINTMENT (OUTPATIENT)
Dept: OBGYN | Facility: CLINIC | Age: 40
End: 2020-07-10
Payer: COMMERCIAL

## 2020-07-10 ENCOUNTER — APPOINTMENT (OUTPATIENT)
Dept: INTERNAL MEDICINE | Facility: CLINIC | Age: 40
End: 2020-07-10
Payer: COMMERCIAL

## 2020-07-10 ENCOUNTER — ASOB RESULT (OUTPATIENT)
Age: 40
End: 2020-07-10

## 2020-07-10 ENCOUNTER — APPOINTMENT (OUTPATIENT)
Dept: MATERNAL FETAL MEDICINE | Facility: CLINIC | Age: 40
End: 2020-07-10
Payer: COMMERCIAL

## 2020-07-10 ENCOUNTER — NON-APPOINTMENT (OUTPATIENT)
Age: 40
End: 2020-07-10

## 2020-07-10 VITALS — DIASTOLIC BLOOD PRESSURE: 70 MMHG | SYSTOLIC BLOOD PRESSURE: 106 MMHG

## 2020-07-10 VITALS — SYSTOLIC BLOOD PRESSURE: 104 MMHG | DIASTOLIC BLOOD PRESSURE: 69 MMHG | WEIGHT: 171.25 LBS | BODY MASS INDEX: 28.5 KG/M2

## 2020-07-10 PROCEDURE — G0108 DIAB MANAGE TRN  PER INDIV: CPT | Mod: 95

## 2020-07-10 PROCEDURE — 0502F SUBSEQUENT PRENATAL CARE: CPT

## 2020-07-10 PROCEDURE — 99214 OFFICE O/P EST MOD 30 MIN: CPT

## 2020-07-10 NOTE — HISTORY OF PRESENT ILLNESS
[de-identified] : 39 y.o. female with h/o diabetes, PCOS, hyperlipidemia presents for f/u on ch medical issues \par \par Currently 33 weeks pregnant, via IVF. First pregnancy. Feeling great, other than difficulty sleeping. \par Taking 1,000mg metformin in the morning. Humulog 5 Units before break fast and lunch 7 units before dinner , Humilin 30 units at bedtime , seeing with endo, now seeing OB specialist. \par Mood has been good. \par No regular exercise. \par Eats healthy. \par AIc was 5.5 7/7/2020\par  taking baby asprin vit d iron prenatal

## 2020-07-10 NOTE — ASSESSMENT
[FreeTextEntry1] : 38 yo F with recently diagnosed DMII (A1C 11.1% 7/2016 ) presented today for f/u on ch medical conditions \par \par got IVF - pregnant 33 weeks due 8/28/2020 \par - got tdap 7/10/2020 \par \par  DM -AIC 5.5 7/2020\par - continue meds as per endo for tight glycemic control \par - advised to increase exercise to 30 minutes daily , do resistance training 2 x a week 20 minutes \par - Reminded patient to maintain healthy eating habits (avoid sweets, carbs) and to continue exercising as she has been, including honey \par - Continue monitoring sugars\par - F/u appointment in 3 months\par -pneumovax 23 given 9/2019 \par \par  Hyperlipidemia\par - self discontinued statins - refused medications \par - low fat diet reviewed \par \par h/o +PPD since 11/2016 rx \par - finished rx [facility]/Pyridoxine\par \par  GERD\par - resolved , stopped PPI \par -Educated patient lifestyle modification, advice to avoid fried food, greasy oily and spicy foods, avoid tomato, orange, lemon , or caffeinated beverages.\par -Avoid reclining upto 3 hours after meals\par \par HCM \par tdap - 2016, 7/10/2020 \par PAP- 2018 as per pt \par flu vaccine given 9/2018 \par pneumonia 23 vaccine 9/2019\par

## 2020-07-14 ENCOUNTER — APPOINTMENT (OUTPATIENT)
Dept: ANTEPARTUM | Facility: CLINIC | Age: 40
End: 2020-07-14
Payer: COMMERCIAL

## 2020-07-14 ENCOUNTER — ASOB RESULT (OUTPATIENT)
Age: 40
End: 2020-07-14

## 2020-07-14 PROCEDURE — 76821 MIDDLE CEREBRAL ARTERY ECHO: CPT

## 2020-07-14 PROCEDURE — 76818 FETAL BIOPHYS PROFILE W/NST: CPT

## 2020-07-14 PROCEDURE — 76820 UMBILICAL ARTERY ECHO: CPT

## 2020-07-14 PROCEDURE — 93976 VASCULAR STUDY: CPT

## 2020-07-17 ENCOUNTER — APPOINTMENT (OUTPATIENT)
Dept: ANTEPARTUM | Facility: CLINIC | Age: 40
End: 2020-07-17
Payer: COMMERCIAL

## 2020-07-17 ENCOUNTER — ASOB RESULT (OUTPATIENT)
Age: 40
End: 2020-07-17

## 2020-07-17 PROCEDURE — 76820 UMBILICAL ARTERY ECHO: CPT

## 2020-07-17 PROCEDURE — 93976 VASCULAR STUDY: CPT

## 2020-07-17 PROCEDURE — 76818 FETAL BIOPHYS PROFILE W/NST: CPT

## 2020-07-21 ENCOUNTER — APPOINTMENT (OUTPATIENT)
Dept: ANTEPARTUM | Facility: CLINIC | Age: 40
End: 2020-07-21
Payer: COMMERCIAL

## 2020-07-21 ENCOUNTER — ASOB RESULT (OUTPATIENT)
Age: 40
End: 2020-07-21

## 2020-07-21 PROCEDURE — 93976 VASCULAR STUDY: CPT

## 2020-07-21 PROCEDURE — 76820 UMBILICAL ARTERY ECHO: CPT

## 2020-07-21 PROCEDURE — 76818 FETAL BIOPHYS PROFILE W/NST: CPT

## 2020-07-24 ENCOUNTER — APPOINTMENT (OUTPATIENT)
Dept: ANTEPARTUM | Facility: CLINIC | Age: 40
End: 2020-07-24
Payer: COMMERCIAL

## 2020-07-24 ENCOUNTER — ASOB RESULT (OUTPATIENT)
Age: 40
End: 2020-07-24

## 2020-07-24 ENCOUNTER — APPOINTMENT (OUTPATIENT)
Dept: OBGYN | Facility: CLINIC | Age: 40
End: 2020-07-24
Payer: COMMERCIAL

## 2020-07-24 ENCOUNTER — NON-APPOINTMENT (OUTPATIENT)
Age: 40
End: 2020-07-24

## 2020-07-24 ENCOUNTER — APPOINTMENT (OUTPATIENT)
Dept: MATERNAL FETAL MEDICINE | Facility: CLINIC | Age: 40
End: 2020-07-24
Payer: COMMERCIAL

## 2020-07-24 VITALS
DIASTOLIC BLOOD PRESSURE: 71 MMHG | WEIGHT: 176.31 LBS | BODY MASS INDEX: 29.34 KG/M2 | SYSTOLIC BLOOD PRESSURE: 116 MMHG

## 2020-07-24 VITALS — WEIGHT: 176 LBS | BODY MASS INDEX: 29.32 KG/M2 | HEIGHT: 65 IN

## 2020-07-24 PROCEDURE — 76820 UMBILICAL ARTERY ECHO: CPT

## 2020-07-24 PROCEDURE — 76816 OB US FOLLOW-UP PER FETUS: CPT

## 2020-07-24 PROCEDURE — 93976 VASCULAR STUDY: CPT

## 2020-07-24 PROCEDURE — 76818 FETAL BIOPHYS PROFILE W/NST: CPT

## 2020-07-24 PROCEDURE — 76821 MIDDLE CEREBRAL ARTERY ECHO: CPT | Mod: 59

## 2020-07-24 PROCEDURE — 0502F SUBSEQUENT PRENATAL CARE: CPT

## 2020-07-24 PROCEDURE — G0108 DIAB MANAGE TRN  PER INDIV: CPT | Mod: 95

## 2020-07-28 ENCOUNTER — ASOB RESULT (OUTPATIENT)
Age: 40
End: 2020-07-28

## 2020-07-28 ENCOUNTER — APPOINTMENT (OUTPATIENT)
Dept: ANTEPARTUM | Facility: CLINIC | Age: 40
End: 2020-07-28
Payer: COMMERCIAL

## 2020-07-28 PROCEDURE — 76820 UMBILICAL ARTERY ECHO: CPT

## 2020-07-28 PROCEDURE — 76821 MIDDLE CEREBRAL ARTERY ECHO: CPT | Mod: 59

## 2020-07-28 PROCEDURE — 93976 VASCULAR STUDY: CPT

## 2020-07-28 PROCEDURE — 76818 FETAL BIOPHYS PROFILE W/NST: CPT

## 2020-07-30 ENCOUNTER — APPOINTMENT (OUTPATIENT)
Dept: OPHTHALMOLOGY | Facility: CLINIC | Age: 40
End: 2020-07-30
Payer: COMMERCIAL

## 2020-07-30 ENCOUNTER — NON-APPOINTMENT (OUTPATIENT)
Age: 40
End: 2020-07-30

## 2020-07-30 PROCEDURE — 92250 FUNDUS PHOTOGRAPHY W/I&R: CPT

## 2020-07-30 PROCEDURE — 92014 COMPRE OPH EXAM EST PT 1/>: CPT

## 2020-07-31 ENCOUNTER — NON-APPOINTMENT (OUTPATIENT)
Age: 40
End: 2020-07-31

## 2020-07-31 ENCOUNTER — APPOINTMENT (OUTPATIENT)
Dept: ANTEPARTUM | Facility: CLINIC | Age: 40
End: 2020-07-31
Payer: COMMERCIAL

## 2020-07-31 ENCOUNTER — ASOB RESULT (OUTPATIENT)
Age: 40
End: 2020-07-31

## 2020-07-31 ENCOUNTER — APPOINTMENT (OUTPATIENT)
Dept: OBGYN | Facility: CLINIC | Age: 40
End: 2020-07-31
Payer: COMMERCIAL

## 2020-07-31 VITALS
SYSTOLIC BLOOD PRESSURE: 107 MMHG | WEIGHT: 178 LBS | BODY MASS INDEX: 29.66 KG/M2 | HEIGHT: 65 IN | DIASTOLIC BLOOD PRESSURE: 74 MMHG

## 2020-07-31 PROCEDURE — 76818 FETAL BIOPHYS PROFILE W/NST: CPT

## 2020-07-31 PROCEDURE — 93976 VASCULAR STUDY: CPT

## 2020-07-31 PROCEDURE — 76821 MIDDLE CEREBRAL ARTERY ECHO: CPT

## 2020-07-31 PROCEDURE — 0502F SUBSEQUENT PRENATAL CARE: CPT

## 2020-07-31 PROCEDURE — 76820 UMBILICAL ARTERY ECHO: CPT

## 2020-08-03 LAB — HIV1+2 AB SPEC QL IA.RAPID: NONREACTIVE

## 2020-08-04 ENCOUNTER — APPOINTMENT (OUTPATIENT)
Dept: ANTEPARTUM | Facility: CLINIC | Age: 40
End: 2020-08-04
Payer: COMMERCIAL

## 2020-08-04 ENCOUNTER — APPOINTMENT (OUTPATIENT)
Dept: MATERNAL FETAL MEDICINE | Facility: CLINIC | Age: 40
End: 2020-08-04
Payer: COMMERCIAL

## 2020-08-04 ENCOUNTER — ASOB RESULT (OUTPATIENT)
Age: 40
End: 2020-08-04

## 2020-08-04 VITALS
HEART RATE: 84 BPM | SYSTOLIC BLOOD PRESSURE: 96 MMHG | BODY MASS INDEX: 29.82 KG/M2 | WEIGHT: 179 LBS | HEIGHT: 65 IN | RESPIRATION RATE: 16 BRPM | OXYGEN SATURATION: 99 % | DIASTOLIC BLOOD PRESSURE: 62 MMHG

## 2020-08-04 VITALS
SYSTOLIC BLOOD PRESSURE: 96 MMHG | WEIGHT: 179 LBS | BODY MASS INDEX: 29.82 KG/M2 | HEART RATE: 84 BPM | HEIGHT: 65 IN | OXYGEN SATURATION: 99 % | DIASTOLIC BLOOD PRESSURE: 62 MMHG | RESPIRATION RATE: 16 BRPM

## 2020-08-04 DIAGNOSIS — Z3A.35 35 WEEKS GESTATION OF PREGNANCY: ICD-10-CM

## 2020-08-04 LAB — B-HEM STREP SPEC QL CULT: NORMAL

## 2020-08-04 PROCEDURE — 76821 MIDDLE CEREBRAL ARTERY ECHO: CPT

## 2020-08-04 PROCEDURE — 93976 VASCULAR STUDY: CPT

## 2020-08-04 PROCEDURE — 76818 FETAL BIOPHYS PROFILE W/NST: CPT

## 2020-08-04 PROCEDURE — 99214 OFFICE O/P EST MOD 30 MIN: CPT

## 2020-08-04 PROCEDURE — 76820 UMBILICAL ARTERY ECHO: CPT

## 2020-08-04 NOTE — DISCUSSION/SUMMARY
[FreeTextEntry1] : She is 36 weeks and 4 days gestation by embryo transfer dating. Her MICHAEL is 2020.\par \par She presents to discuss the timing of delivery because of her type 2 diabetes and other conditions affecting the pregnancy. Regarding her type 2 diabetes, she is taking 1000 mg of metformin in the morning, and 30 units of NPH insulin at bedtime. She is also taking rapid acting NovoLog insulin 5 units before breakfast, and 7 units before lunch and dinner.  She performs fasting and 1 hour postprandial self glucose monitoring.  My review of her glucose diary from 20  to 8/3/20 reveals 1 elevated fasting glucose reading. She also had  4 elevated postprandial glucose readings from not following the recommended diet. Hemoglobin A1c on 2020 was 5.5%. She was advised to continue having twice weekly fetal testing until delivery.\par \par Regarding the timing of delivery and her type 2 diabetes, I told her that she is at risk for an adverse pregnancy outcomes such as stillbirth. She has advanced maternal age which is an additional risk factor for stillbirths. Therefore, an early term delivery is recommended between 38 0/7 and 38 6/7 weeks of gestation (ACOG Committee Opinion No.764, 2019). \par \par I told her that early term deliveries (37 0/7 - 38 6/7 weeks gestation) have been associated with an increased risk for  morbidity and NICU admission when compared to term deliveries (39 0/7 - 41 6/7 weeks gestation).  In one study from a population in [facility] published in [employer] 2013 early term deliveries had higher risks for hypoglycemia (4.9 vs. 2.5%), NICU admissions (8.8% vs.5.3%), need for respiratory support (2.0% vs. 1.1%), requirement for intravenous fluids (7.5% vs. 4.4%), treatment with antibiotics (2.6% vs. 1.6%), and need for mechanical ventilation or intubation (0.6% vs. 0.1%). \par \par She is taking a baby aspirin daily to reduce the risk of having preeclampsia,  delivery, and growth restricted infants.

## 2020-08-04 NOTE — PAST MEDICAL HISTORY
[HIV Infection] : no HIV [Exposure To Gonorrhea] : no gonorrhea [Chlamydial Infections] : no chlamydia [Herpes Simplex] : no genital herpes [Syphilis] : no syphilis [Hepatitis, B Virus] : no Hepatitis B [Human Papilloma Virus Infection] : no genital warts [Trichomoniasis] : no trichomoniasis [Hepatitis, C Virus] : no Hepatitis C

## 2020-08-04 NOTE — ACTIVE PROBLEMS
[Hypertension] : no hypertension [Autoimmune Disease] : no autoimmune disease [Heart Disease] : no heart disease [Neurologic Disorder] : no neurologic disorder, no epilepsy [Renal Disease] : no kidney disease, no UTI [Psychiatric Disorders] : no psychiatric disorders [Thrombophlebitis] : no varicosities, no phlebitis [Hepatic Disorder] : no hepatitis, no liver disease [Depression] : no depression, no post partum depression [Blood Transfusion (___ Ml)] : no history of blood transfusion [Trauma] : no trauma/violence [Thyroid Disorder] : no thyroid dysfunction

## 2020-08-04 NOTE — OB HISTORY
[LMP: ___] : LMP: [unfilled] [Reproductive Assisted] : Reproductive Assisted conception [Definite:  ___ (Date)] : the last menstrual period was [unfilled] [Normal Amount/Duration] : was of a normal amount and duration [Regular Cycle Intervals] : periods have been regular [Frequency: Q ___ days] : menstrual periods occur approximately every [unfilled] days [Menarche Age: ____] : age at menarche was [unfilled] [FreeTextEntry1] : Her first prenatal visit was February 7, 2020. IVF conception. Pregnancy dated by 5 day embryo transfer. \par \par Hemoglobin A1c done on February 7, 2020 was 6.0%. Hemoglobin A1c level done on April 30, 2020 was 5.1%.\par \par She had genetic counseling on February 6, 2020 due to her being a fragile X syndrome carrier and having advanced maternal age. She declined prenatal diagnostic testing. She elected to have a noninvasive prenatal screen test which was done on February 6, 2020. The results of the noninvasive prenatal screen were WNL. The fetal sex was reported to be female. She had a normal first trimester screening test and a normal sequential screening test.\par \par She had a maternal fetal medicine consultation with me during February 13, 2020 for her type 2 diabetes, IVF conception, and advanced maternal age. She had a followup MFM consult with Dr. Sabillon on April 30, 2024 for her type 2 diabetes. She has had followup visits with our diabetes educator on May 8, May 29, and June 5, 2020. On May 8, 2020 she was started on 12 units of NPH insulin at bedtime. On May 29, 2020 the bedtime NPH insulin was increased to 20 units. She was seen by me on July 6, 2020 because of her diabetes. She had a Telehealth visit with our diabetes educator on July 24, 2020.\par \par She had a followup maternal fetal medicine visit with me on June 12, 2020 due to her refusal to take blood products during labor and delivery because she is a [employer]. [On BCP at conception] : the patient was not on BCP at conception [Menstrual Cramps] : no menstrual cramps [Spotting Between  Menses] : no spotting between menses

## 2020-08-07 ENCOUNTER — APPOINTMENT (OUTPATIENT)
Dept: ANTEPARTUM | Facility: CLINIC | Age: 40
End: 2020-08-07
Payer: COMMERCIAL

## 2020-08-07 ENCOUNTER — ASOB RESULT (OUTPATIENT)
Age: 40
End: 2020-08-07

## 2020-08-07 ENCOUNTER — NON-APPOINTMENT (OUTPATIENT)
Age: 40
End: 2020-08-07

## 2020-08-07 ENCOUNTER — APPOINTMENT (OUTPATIENT)
Dept: OBGYN | Facility: CLINIC | Age: 40
End: 2020-08-07
Payer: COMMERCIAL

## 2020-08-07 ENCOUNTER — OUTPATIENT (OUTPATIENT)
Dept: INPATIENT UNIT | Facility: HOSPITAL | Age: 40
LOS: 1 days | End: 2020-08-07
Payer: COMMERCIAL

## 2020-08-07 VITALS
SYSTOLIC BLOOD PRESSURE: 115 MMHG | WEIGHT: 180.38 LBS | DIASTOLIC BLOOD PRESSURE: 73 MMHG | HEIGHT: 65 IN | BODY MASS INDEX: 30.05 KG/M2 | HEART RATE: 84 BPM

## 2020-08-07 VITALS — SYSTOLIC BLOOD PRESSURE: 113 MMHG | DIASTOLIC BLOOD PRESSURE: 73 MMHG | HEART RATE: 78 BPM

## 2020-08-07 DIAGNOSIS — O47.1 FALSE LABOR AT OR AFTER 37 COMPLETED WEEKS OF GESTATION: ICD-10-CM

## 2020-08-07 PROCEDURE — 76818 FETAL BIOPHYS PROFILE W/NST: CPT

## 2020-08-07 PROCEDURE — 0502F SUBSEQUENT PRENATAL CARE: CPT

## 2020-08-07 NOTE — OB PROVIDER TRIAGE NOTE - HISTORY OF PRESENT ILLNESS
39 y.o.  at 37 weeks gestation presents to L&D for vaginal bleeding.    She reports having vaginal bleeding this morning and went to her OBGYN to be checked. She was subsequently sent home with an exam of 2/70/-3.  She reports irregular contractions 40 minutes apart, starting this morning.  Endorses no changes since her appointment this morning. She is concerned that the vaginal bleeding is not normal.  Endorses fetal movement, denies loss of fluid.     ICU Vital Signs Last 24 Hrs  T(C): --    HR: 78 (07 Aug 2020 19:04) (78 - 78)  BP: 117/74 (07 Aug 2020 19:26) (113/73 - 117/74)    Gen: NAD    SVE: 2/60/-3, trace blood in the vaginal vault with mucous

## 2020-08-10 ENCOUNTER — INPATIENT (INPATIENT)
Facility: HOSPITAL | Age: 40
LOS: 1 days | Discharge: HOME | End: 2020-08-12
Attending: OBSTETRICS & GYNECOLOGY | Admitting: OBSTETRICS & GYNECOLOGY
Payer: COMMERCIAL

## 2020-08-10 ENCOUNTER — OUTPATIENT (OUTPATIENT)
Dept: OUTPATIENT SERVICES | Facility: HOSPITAL | Age: 40
LOS: 1 days | End: 2020-08-10
Payer: COMMERCIAL

## 2020-08-10 ENCOUNTER — TRANSCRIPTION ENCOUNTER (OUTPATIENT)
Age: 40
End: 2020-08-10

## 2020-08-10 VITALS — DIASTOLIC BLOOD PRESSURE: 91 MMHG | SYSTOLIC BLOOD PRESSURE: 135 MMHG | HEART RATE: 98 BPM

## 2020-08-10 VITALS
TEMPERATURE: 98 F | RESPIRATION RATE: 20 BRPM | SYSTOLIC BLOOD PRESSURE: 135 MMHG | WEIGHT: 179.9 LBS | HEIGHT: 65 IN | DIASTOLIC BLOOD PRESSURE: 91 MMHG | HEART RATE: 98 BPM

## 2020-08-10 DIAGNOSIS — O47.1 FALSE LABOR AT OR AFTER 37 COMPLETED WEEKS OF GESTATION: ICD-10-CM

## 2020-08-10 LAB
ALBUMIN SERPL ELPH-MCNC: 3.2 G/DL — LOW (ref 3.3–5.2)
ALP SERPL-CCNC: 128 U/L — HIGH (ref 40–120)
ALT FLD-CCNC: 13 U/L — SIGNIFICANT CHANGE UP
ANION GAP SERPL CALC-SCNC: 16 MMOL/L — SIGNIFICANT CHANGE UP (ref 5–17)
AST SERPL-CCNC: 21 U/L — SIGNIFICANT CHANGE UP
BASOPHILS # BLD AUTO: 0.05 K/UL — SIGNIFICANT CHANGE UP (ref 0–0.2)
BASOPHILS NFR BLD AUTO: 0.5 % — SIGNIFICANT CHANGE UP (ref 0–2)
BILIRUB SERPL-MCNC: 0.2 MG/DL — LOW (ref 0.4–2)
BLD GP AB SCN SERPL QL: SIGNIFICANT CHANGE UP
BUN SERPL-MCNC: 9 MG/DL — SIGNIFICANT CHANGE UP (ref 8–20)
CALCIUM SERPL-MCNC: 9.4 MG/DL — SIGNIFICANT CHANGE UP (ref 8.6–10.2)
CHLORIDE SERPL-SCNC: 99 MMOL/L — SIGNIFICANT CHANGE UP (ref 98–107)
CO2 SERPL-SCNC: 19 MMOL/L — LOW (ref 22–29)
CREAT SERPL-MCNC: 0.83 MG/DL — SIGNIFICANT CHANGE UP (ref 0.5–1.3)
EOSINOPHIL # BLD AUTO: 0.05 K/UL — SIGNIFICANT CHANGE UP (ref 0–0.5)
EOSINOPHIL NFR BLD AUTO: 0.5 % — SIGNIFICANT CHANGE UP (ref 0–6)
GLUCOSE BLDC GLUCOMTR-MCNC: 108 MG/DL — HIGH (ref 70–99)
GLUCOSE BLDC GLUCOMTR-MCNC: 193 MG/DL — HIGH (ref 70–99)
GLUCOSE BLDC GLUCOMTR-MCNC: 93 MG/DL — SIGNIFICANT CHANGE UP (ref 70–99)
GLUCOSE BLDC GLUCOMTR-MCNC: 97 MG/DL — SIGNIFICANT CHANGE UP (ref 70–99)
GLUCOSE SERPL-MCNC: 99 MG/DL — SIGNIFICANT CHANGE UP (ref 70–99)
HCT VFR BLD CALC: 38.9 % — SIGNIFICANT CHANGE UP (ref 34.5–45)
HGB BLD-MCNC: 13.1 G/DL — SIGNIFICANT CHANGE UP (ref 11.5–15.5)
IMM GRANULOCYTES NFR BLD AUTO: 0.9 % — SIGNIFICANT CHANGE UP (ref 0–1.5)
LYMPHOCYTES # BLD AUTO: 3.63 K/UL — HIGH (ref 1–3.3)
LYMPHOCYTES # BLD AUTO: 33.2 % — SIGNIFICANT CHANGE UP (ref 13–44)
MCHC RBC-ENTMCNC: 31.3 PG — SIGNIFICANT CHANGE UP (ref 27–34)
MCHC RBC-ENTMCNC: 33.7 GM/DL — SIGNIFICANT CHANGE UP (ref 32–36)
MCV RBC AUTO: 93.1 FL — SIGNIFICANT CHANGE UP (ref 80–100)
MONOCYTES # BLD AUTO: 1.1 K/UL — HIGH (ref 0–0.9)
MONOCYTES NFR BLD AUTO: 10 % — SIGNIFICANT CHANGE UP (ref 2–14)
NEUTROPHILS # BLD AUTO: 6.02 K/UL — SIGNIFICANT CHANGE UP (ref 1.8–7.4)
NEUTROPHILS NFR BLD AUTO: 54.9 % — SIGNIFICANT CHANGE UP (ref 43–77)
PLATELET # BLD AUTO: 243 K/UL — SIGNIFICANT CHANGE UP (ref 150–400)
POTASSIUM SERPL-MCNC: 3.8 MMOL/L — SIGNIFICANT CHANGE UP (ref 3.5–5.3)
POTASSIUM SERPL-SCNC: 3.8 MMOL/L — SIGNIFICANT CHANGE UP (ref 3.5–5.3)
PROT SERPL-MCNC: 6.6 G/DL — SIGNIFICANT CHANGE UP (ref 6.6–8.7)
RBC # BLD: 4.18 M/UL — SIGNIFICANT CHANGE UP (ref 3.8–5.2)
RBC # FLD: 13.4 % — SIGNIFICANT CHANGE UP (ref 10.3–14.5)
SARS-COV-2 RNA SPEC QL NAA+PROBE: SIGNIFICANT CHANGE UP
SODIUM SERPL-SCNC: 134 MMOL/L — LOW (ref 135–145)
T PALLIDUM AB TITR SER: NEGATIVE — SIGNIFICANT CHANGE UP
WBC # BLD: 10.95 K/UL — HIGH (ref 3.8–10.5)
WBC # FLD AUTO: 10.95 K/UL — HIGH (ref 3.8–10.5)

## 2020-08-10 PROCEDURE — G0463: CPT

## 2020-08-10 PROCEDURE — 59400 OBSTETRICAL CARE: CPT

## 2020-08-10 PROCEDURE — 59025 FETAL NON-STRESS TEST: CPT

## 2020-08-10 RX ORDER — OXYTOCIN 10 UNIT/ML
333.33 VIAL (ML) INJECTION
Qty: 20 | Refills: 0 | Status: COMPLETED | OUTPATIENT
Start: 2020-08-10 | End: 2020-08-10

## 2020-08-10 RX ORDER — DEXTROSE 50 % IN WATER 50 %
25 SYRINGE (ML) INTRAVENOUS ONCE
Refills: 0 | Status: DISCONTINUED | OUTPATIENT
Start: 2020-08-10 | End: 2020-08-12

## 2020-08-10 RX ORDER — DIBUCAINE 1 %
1 OINTMENT (GRAM) RECTAL EVERY 6 HOURS
Refills: 0 | Status: DISCONTINUED | OUTPATIENT
Start: 2020-08-10 | End: 2020-08-12

## 2020-08-10 RX ORDER — INSULIN LISPRO 100/ML
3 VIAL (ML) SUBCUTANEOUS
Qty: 0 | Refills: 0 | DISCHARGE

## 2020-08-10 RX ORDER — IBUPROFEN 200 MG
600 TABLET ORAL EVERY 6 HOURS
Refills: 0 | Status: COMPLETED | OUTPATIENT
Start: 2020-08-10 | End: 2021-07-09

## 2020-08-10 RX ORDER — METFORMIN HYDROCHLORIDE 850 MG/1
1 TABLET ORAL
Qty: 0 | Refills: 0 | DISCHARGE
Start: 2020-08-10

## 2020-08-10 RX ORDER — SODIUM CHLORIDE 9 MG/ML
1000 INJECTION, SOLUTION INTRAVENOUS
Refills: 0 | Status: DISCONTINUED | OUTPATIENT
Start: 2020-08-10 | End: 2020-08-12

## 2020-08-10 RX ORDER — SODIUM CHLORIDE 9 MG/ML
1000 INJECTION, SOLUTION INTRAVENOUS
Refills: 0 | Status: DISCONTINUED | OUTPATIENT
Start: 2020-08-10 | End: 2020-08-10

## 2020-08-10 RX ORDER — INSULIN LISPRO 100/ML
4 VIAL (ML) SUBCUTANEOUS
Refills: 0 | Status: DISCONTINUED | OUTPATIENT
Start: 2020-08-10 | End: 2020-08-12

## 2020-08-10 RX ORDER — DEXTROSE 50 % IN WATER 50 %
12.5 SYRINGE (ML) INTRAVENOUS ONCE
Refills: 0 | Status: DISCONTINUED | OUTPATIENT
Start: 2020-08-10 | End: 2020-08-12

## 2020-08-10 RX ORDER — OXYCODONE HYDROCHLORIDE 5 MG/1
5 TABLET ORAL ONCE
Refills: 0 | Status: DISCONTINUED | OUTPATIENT
Start: 2020-08-10 | End: 2020-08-12

## 2020-08-10 RX ORDER — SIMETHICONE 80 MG/1
80 TABLET, CHEWABLE ORAL EVERY 4 HOURS
Refills: 0 | Status: DISCONTINUED | OUTPATIENT
Start: 2020-08-10 | End: 2020-08-12

## 2020-08-10 RX ORDER — GLUCAGON INJECTION, SOLUTION 0.5 MG/.1ML
1 INJECTION, SOLUTION SUBCUTANEOUS ONCE
Refills: 0 | Status: DISCONTINUED | OUTPATIENT
Start: 2020-08-10 | End: 2020-08-12

## 2020-08-10 RX ORDER — BENZOCAINE 10 %
1 GEL (GRAM) MUCOUS MEMBRANE EVERY 6 HOURS
Refills: 0 | Status: DISCONTINUED | OUTPATIENT
Start: 2020-08-10 | End: 2020-08-12

## 2020-08-10 RX ORDER — PRAMOXINE HYDROCHLORIDE 150 MG/15G
1 AEROSOL, FOAM RECTAL EVERY 4 HOURS
Refills: 0 | Status: DISCONTINUED | OUTPATIENT
Start: 2020-08-10 | End: 2020-08-12

## 2020-08-10 RX ORDER — FERROUS SULFATE 325(65) MG
1 TABLET ORAL
Qty: 30 | Refills: 0
Start: 2020-08-10 | End: 2020-09-08

## 2020-08-10 RX ORDER — TETANUS TOXOID, REDUCED DIPHTHERIA TOXOID AND ACELLULAR PERTUSSIS VACCINE, ADSORBED 5; 2.5; 8; 8; 2.5 [IU]/.5ML; [IU]/.5ML; UG/.5ML; UG/.5ML; UG/.5ML
0.5 SUSPENSION INTRAMUSCULAR ONCE
Refills: 0 | Status: DISCONTINUED | OUTPATIENT
Start: 2020-08-10 | End: 2020-08-12

## 2020-08-10 RX ORDER — INSULIN LISPRO 100/ML
5 VIAL (ML) SUBCUTANEOUS
Refills: 0 | Status: DISCONTINUED | OUTPATIENT
Start: 2020-08-10 | End: 2020-08-12

## 2020-08-10 RX ORDER — TRANEXAMIC ACID 100 MG/ML
1000 INJECTION, SOLUTION INTRAVENOUS ONCE
Refills: 0 | Status: COMPLETED | OUTPATIENT
Start: 2020-08-10 | End: 2020-08-10

## 2020-08-10 RX ORDER — ACETAMINOPHEN 500 MG
975 TABLET ORAL
Refills: 0 | Status: DISCONTINUED | OUTPATIENT
Start: 2020-08-10 | End: 2020-08-12

## 2020-08-10 RX ORDER — LANOLIN
1 OINTMENT (GRAM) TOPICAL EVERY 6 HOURS
Refills: 0 | Status: DISCONTINUED | OUTPATIENT
Start: 2020-08-10 | End: 2020-08-12

## 2020-08-10 RX ORDER — DEXTROSE 50 % IN WATER 50 %
15 SYRINGE (ML) INTRAVENOUS ONCE
Refills: 0 | Status: DISCONTINUED | OUTPATIENT
Start: 2020-08-10 | End: 2020-08-12

## 2020-08-10 RX ORDER — CITRIC ACID/SODIUM CITRATE 300-500 MG
30 SOLUTION, ORAL ORAL ONCE
Refills: 0 | Status: DISCONTINUED | OUTPATIENT
Start: 2020-08-10 | End: 2020-08-10

## 2020-08-10 RX ORDER — INSULIN LISPRO 100/ML
4 VIAL (ML) SUBCUTANEOUS
Refills: 0 | Status: DISCONTINUED | OUTPATIENT
Start: 2020-08-10 | End: 2020-08-10

## 2020-08-10 RX ORDER — AER TRAVELER 0.5 G/1
1 SOLUTION RECTAL; TOPICAL EVERY 4 HOURS
Refills: 0 | Status: DISCONTINUED | OUTPATIENT
Start: 2020-08-10 | End: 2020-08-12

## 2020-08-10 RX ORDER — KETOROLAC TROMETHAMINE 30 MG/ML
30 SYRINGE (ML) INJECTION ONCE
Refills: 0 | Status: DISCONTINUED | OUTPATIENT
Start: 2020-08-10 | End: 2020-08-12

## 2020-08-10 RX ORDER — SODIUM CHLORIDE 9 MG/ML
3 INJECTION INTRAMUSCULAR; INTRAVENOUS; SUBCUTANEOUS EVERY 8 HOURS
Refills: 0 | Status: DISCONTINUED | OUTPATIENT
Start: 2020-08-10 | End: 2020-08-12

## 2020-08-10 RX ORDER — DIPHENHYDRAMINE HCL 50 MG
25 CAPSULE ORAL EVERY 6 HOURS
Refills: 0 | Status: DISCONTINUED | OUTPATIENT
Start: 2020-08-10 | End: 2020-08-12

## 2020-08-10 RX ORDER — METFORMIN HYDROCHLORIDE 850 MG/1
1000 TABLET ORAL DAILY
Refills: 0 | Status: DISCONTINUED | OUTPATIENT
Start: 2020-08-10 | End: 2020-08-12

## 2020-08-10 RX ORDER — MAGNESIUM HYDROXIDE 400 MG/1
30 TABLET, CHEWABLE ORAL
Refills: 0 | Status: DISCONTINUED | OUTPATIENT
Start: 2020-08-10 | End: 2020-08-12

## 2020-08-10 RX ORDER — INSULIN LISPRO 100/ML
4 VIAL (ML) SUBCUTANEOUS
Qty: 0 | Refills: 0 | DISCHARGE

## 2020-08-10 RX ORDER — HUMAN INSULIN 100 [IU]/ML
15 INJECTION, SUSPENSION SUBCUTANEOUS AT BEDTIME
Refills: 0 | Status: DISCONTINUED | OUTPATIENT
Start: 2020-08-10 | End: 2020-08-12

## 2020-08-10 RX ORDER — HUMAN INSULIN 100 [IU]/ML
15 INJECTION, SUSPENSION SUBCUTANEOUS
Qty: 0 | Refills: 0 | DISCHARGE
Start: 2020-08-10

## 2020-08-10 RX ORDER — OXYCODONE HYDROCHLORIDE 5 MG/1
5 TABLET ORAL
Refills: 0 | Status: DISCONTINUED | OUTPATIENT
Start: 2020-08-10 | End: 2020-08-12

## 2020-08-10 RX ORDER — IBUPROFEN 200 MG
600 TABLET ORAL EVERY 6 HOURS
Refills: 0 | Status: DISCONTINUED | OUTPATIENT
Start: 2020-08-10 | End: 2020-08-12

## 2020-08-10 RX ORDER — HYDROCORTISONE 1 %
1 OINTMENT (GRAM) TOPICAL EVERY 6 HOURS
Refills: 0 | Status: DISCONTINUED | OUTPATIENT
Start: 2020-08-10 | End: 2020-08-12

## 2020-08-10 RX ORDER — INSULIN LISPRO 100/ML
3 VIAL (ML) SUBCUTANEOUS
Refills: 0 | Status: DISCONTINUED | OUTPATIENT
Start: 2020-08-10 | End: 2020-08-12

## 2020-08-10 RX ORDER — OXYTOCIN 10 UNIT/ML
333.33 VIAL (ML) INJECTION
Qty: 20 | Refills: 0 | Status: DISCONTINUED | OUTPATIENT
Start: 2020-08-10 | End: 2020-08-12

## 2020-08-10 RX ORDER — INSULIN LISPRO 100/ML
VIAL (ML) SUBCUTANEOUS
Refills: 0 | Status: DISCONTINUED | OUTPATIENT
Start: 2020-08-10 | End: 2020-08-12

## 2020-08-10 RX ADMIN — Medication 1000 MILLIUNIT(S)/MIN: at 09:28

## 2020-08-10 RX ADMIN — HUMAN INSULIN 15 UNIT(S): 100 INJECTION, SUSPENSION SUBCUTANEOUS at 22:12

## 2020-08-10 RX ADMIN — Medication 2: at 17:53

## 2020-08-10 RX ADMIN — Medication 600 MILLIGRAM(S): at 17:18

## 2020-08-10 RX ADMIN — TRANEXAMIC ACID 220 MILLIGRAM(S): 100 INJECTION, SOLUTION INTRAVENOUS at 06:54

## 2020-08-10 RX ADMIN — Medication 600 MILLIGRAM(S): at 18:18

## 2020-08-10 RX ADMIN — Medication 5 UNIT(S): at 17:52

## 2020-08-10 NOTE — OB PROVIDER H&P - ASSESSMENT
39 y.o.  at 37 weeks 3 days gestations admitted to labor and delivery for labor.    - admission labs  - continuous fetal heart monitoring  - desires an epidural  - GBS negative  - expectant management    Discussed with Dr. Betancourt 39 y.o.  at 37 weeks 3 days gestations admitted to labor and delivery for labor. Cat 1 tracing.    - admission labs  - continuous fetal heart monitoring  - desires an epidural  - GBS negative  - expectant management    Discussed with Dr. Betancourt

## 2020-08-10 NOTE — OB PROVIDER H&P - NSHPPHYSICALEXAM_GEN_ALL_CORE
ICU Vital Signs Last 24 Hrs  T(C): 36.6 (10 Aug 2020 02:08), Max: 36.6 (10 Aug 2020 02:08)  T(F): 97.9 (10 Aug 2020 02:08), Max: 97.9 (10 Aug 2020 02:08)  HR: 98 (10 Aug 2020 02:08) (98 - 98)  BP: 135/91 (10 Aug 2020 02:08) (135/91 - 135/91)  RR: 20 (10 Aug 2020 02:08) (20 - 20)    SVE: 5/100/-1

## 2020-08-10 NOTE — DISCHARGE NOTE OB - CARE PROVIDER_API CALL
Jaxson Diaz  [facility]ICS AND GYN[facility]OLOG[facility]  [address] [place] [id number]  Phone: [phone]  Fax: [phone]  Follow Up Time:

## 2020-08-10 NOTE — OB PROVIDER H&P - HISTORY OF PRESENT ILLNESS
Anika Qureshi is a 39 y.o.  at 38 weeks 4 days gestation who presents to L&D to rule out labor.   She reports contractions throughout yesterday and endorses loss of fluid at 12:50. Her contractions became stronger after this.  She endorses fetal movement. Denies vaginal bleeding.  She is a Jehova's witness.     This pregnancy is complicated by:  - GDMA2 on insulin  - IVF pregnancy    PMH: Type 2 DM  Meds: PNV, insulin, metformin  NKDA    ABO: O+  GBS: negative  Rubella: Immune  Rubeola: Immune Anika Qureshi is a 39 y.o.  at 38 weeks 4 days gestation who presents to L&D for contractions and rupture of membranes   She reports contractions throughout yesterday and endorses loss of fluid at 12:50. Her contractions became stronger after this.  She endorses fetal movement. Denies vaginal bleeding.  She is a Jehova's witness.     This pregnancy is complicated by:  - Type 2 DM, on metformin and insulin  - IVF pregnancy    PMH: Type 2 DM  Meds: PNV, insulin, metformin  NKDA    ABO: O+  GBS: negative  Rubella: Immune  Rubeola: Immune Anika Qureshi is a 39 y.o.  at 37 weeks 4 days gestation who presents to L&D for contractions and rupture of membranes   She reports contractions throughout yesterday and endorses loss of fluid at 0050. Her contractions became stronger after this.  She endorses fetal movement. Denies vaginal bleeding.  She is a Jehova's witness.     This pregnancy is complicated by:  - Type 2 DM, on metformin and insulin  - IVF pregnancy    PMH: Type 2 DM  Meds: PNV, insulin on Humalog 5-breakfast, 7-lunch, 7-dinner, Humulin 30 qhs, metformin 1,000mg in AM  NKDA    ABO: O+  GBS: negative  Rubella: Immune  Rubeola: Immune Anika Qureshi is a 39 y.o.  at 37 weeks 4 days gestation who presents to L&D for contractions and rupture of membranes   She reports contractions throughout yesterday and endorses loss of fluid at 0050. Her contractions became stronger after this.  She endorses fetal movement. Denies vaginal bleeding.  She is a Jehova's witness.     This pregnancy is complicated by:  - Type 2 DM, on metformin and insulin  - IVF pregnancy    PMH: Type 2 DM  Meds: PNV, insulin on Humalog 5-breakfast, 7-lunch, 7-dinner, Humulin 30 qhs, metformin 1,000mg in AM, Iron 325mg BID  NKDA    ABO: O+  GBS: negative  Rubella: Immune  Rubeola: Immune Anika Qureshi is a 39 y.o.  at 37 weeks 4 days gestation who presents to L&D for contractions and rupture of membranes   She reports contractions throughout yesterday and endorses loss of fluid at 0050. Her contractions became stronger after this.  She endorses fetal movement. Denies vaginal bleeding.  She is a Jehova's witness.     This pregnancy is complicated by:  - Type 2 DM, on metformin and insulin  - IVF pregnancy    PMH: Type 2 DM  Meds: PNV, insulin on Humalog 5-breakfast, 7-lunch, 7-dinner, Humulin 30 qhs, metformin 1,000mg in AM, Iron 325mg BID, baby aspirin  NKDA    ABO: O+  GBS: negative  Rubella: Immune  Rubeola: Immune

## 2020-08-10 NOTE — DISCHARGE NOTE OB - HOSPITAL COURSE
She is a 38 yo now  who presented at 37 weeks 3 days GA in active labor and had a normal delivery. She had a normal postpartum course and was discharged home in stable condition on postpartum day 1. Upon discharge she was voiding, tolerating PO, and ambulating.

## 2020-08-10 NOTE — DISCHARGE NOTE OB - PROVIDER TOKENS
FREE:[LAST:[Emily],FIRST:[Jaxson],PHONE:[phone]],FAX:[phone]],ADDRESS:[[facility]  [address] [place] [address]]]

## 2020-08-10 NOTE — OB PROVIDER LABOR PROGRESS NOTE - NS_SUBJECTIVE/OBJECTIVE_OBGYN_ALL_OB_FT
40 y/o  at 37w3d in early labor. She is comfortable with the epidural. No presently feeling pressure or any urge to push.

## 2020-08-10 NOTE — OB PROVIDER DELIVERY SUMMARY - NSPROVIDERDELIVERYNOTE_OBGYN_ALL_OB_FT
Vaginal Delivery Summary    Procedure: Normal spontaneous vaginal delivery   Findings: Viable female infant delivered in cephalic presentation at 0902, placenta delivered at 0908.  Apgar scores 9/9.   Weight will be recorded after 1 hour to allow for skin-to-skin contact.  Laceration(s): 1st degree vaginal  Repair: 2-0 vicryl on SH  EBL: 200  Complications: none    Procedure:   Patient felt rectal pressure and was found to be fully dilated, 0 station. She pushed effectively for 70 minutes. She delivered a viable female infant. Delayed cord clamping was performed for 30 seconds. Placenta delivered intact and pitocin was started at the delivery. Perineum and vagina were inspected, 1st degree vaginal laceration present. Adequate hemostasis was obtained. Vaginal Delivery Summary    Procedure: Normal spontaneous vaginal delivery   Findings: Viable female infant delivered in cephalic presentation at 0902, placenta delivered at 0908.  Apgar scores 9/9.   Weight will be recorded after 1 hour to allow for skin-to-skin contact.  Laceration(s): 1st degree vaginal  Repair: 2-0 vicryl on SH  EBL: 200  Complications: none    Procedure:   Patient felt rectal pressure and was found to be fully dilated, +1 station. She pushed effectively for 70 minutes. She delivered a viable female infant. Delayed cord clamping was performed for 30 seconds. Placenta delivered intact and pitocin was started at the delivery. Perineum and vagina were inspected, 1st degree vaginal laceration present. Adequate hemostasis was obtained.    OB attending:  PGY1 note reviewed, uncomplicated delivery  Christina Klein MD

## 2020-08-10 NOTE — OB PROVIDER LABOR PROGRESS NOTE - ASSESSMENT
Patient comfortable s/p epidural.    Vital Signs Last 24 Hrs  T(C): 36.6 (10 Aug 2020 02:08), Max: 36.6 (10 Aug 2020 02:08)  T(F): 97.9 (10 Aug 2020 02:08), Max: 97.9 (10 Aug 2020 02:08)  HR: 92 (10 Aug 2020 03:21) (84 - 98)  BP: 122/74 (10 Aug 2020 03:21) (122/74 - 137/75)  RR: 20 (10 Aug 2020 02:08) (20 - 20)    FHT: Cat 2, recurrent variables with moderate variability  [place]: q1-2m  SVE: 6/100/-1    Resuscitate FHT with maternal repositioning, IV fluid hydration, O2 via NRB  Continue expectant management  Mane pearson/robert Betancourt
40 y/o  at 37w3d in early labor.   - VSS  - fully dilated, 0 station  - will begin pushing effort in 20 mins    PGY-4- Dr. Sanchez aware.

## 2020-08-10 NOTE — DISCHARGE NOTE OB - PLAN OF CARE
Rapid recovery Patient should transition to regular activity level. Resume regular diet. Patient should follow up with her OB for a postpartum checkup 4-6 weeks after delivery. Patient should call her doctor sooner if she develops a fever or uncontrolled vaginal bleeding. Please call sooner if there are any other concerns. glucose control -follow up with your endocrinologist in one week

## 2020-08-10 NOTE — DISCHARGE NOTE OB - PATIENT PORTAL LINK FT
You can access the FollowMyHealth Patient Portal offered by [facility] by registering at the following website: [email or web address]. By joining [facility]’s FollowMyHealth portal, you will also be able to view your health information using other applications (apps) compatible with our system.

## 2020-08-10 NOTE — DISCHARGE NOTE OB - CARE PLAN
Principal Discharge DX:	 (normal spontaneous vaginal delivery)  Goal:	Rapid recovery  Assessment and plan of treatment:	Patient should transition to regular activity level. Resume regular diet. Patient should follow up with her OB for a postpartum checkup 4-6 weeks after delivery. Patient should call her doctor sooner if she develops a fever or uncontrolled vaginal bleeding. Please call sooner if there are any other concerns.  Secondary Diagnosis:	Type 2 diabetes mellitus  Goal:	glucose control  Assessment and plan of treatment:	-follow up with your endocrinologist in one week

## 2020-08-10 NOTE — DISCHARGE NOTE OB - MATERIALS PROVIDED
Birth Certificate Instructions/Breastfeeding Log/Breastfeeding Mother’s Support Group Information/Tdap Vaccination (VIS Pub Date: 2012)/Back To Sleep Handout/Shaken Baby Prevention Handout/[facility]  Screening Program/[facility] Hearing Screen Program

## 2020-08-10 NOTE — OB RN DELIVERY SUMMARY - NS_SEPSISRSKCALC_OBGYN_ALL_OB_FT
EOS calculated successfully. EOS Risk Factor: 0.5/1000 live births ([facility] national incidence); GA=37w3d; Temp=98.6; ROM=8.367; GBS='Negative'; Antibiotics='No antibiotics or any antibiotics < 2 hrs prior to birth'

## 2020-08-10 NOTE — OB PROVIDER H&P - CURRENT PREGNANCY COMPLICATIONS, OB PROFILE
Type 2 diabetes/Gestational Diabetes/Maternal Medical Condition Type 2 diabetes/Maternal Medical Condition

## 2020-08-11 ENCOUNTER — APPOINTMENT (OUTPATIENT)
Dept: ANTEPARTUM | Facility: CLINIC | Age: 40
End: 2020-08-11

## 2020-08-11 LAB
A1C WITH ESTIMATED AVERAGE GLUCOSE RESULT: 5.3 % — SIGNIFICANT CHANGE UP (ref 4–5.6)
ESTIMATED AVERAGE GLUCOSE: 105 MG/DL — SIGNIFICANT CHANGE UP (ref 68–114)
GLUCOSE BLDC GLUCOMTR-MCNC: 104 MG/DL — HIGH (ref 70–99)
GLUCOSE BLDC GLUCOMTR-MCNC: 125 MG/DL — HIGH (ref 70–99)
GLUCOSE BLDC GLUCOMTR-MCNC: 62 MG/DL — LOW (ref 70–99)
GLUCOSE BLDC GLUCOMTR-MCNC: 81 MG/DL — SIGNIFICANT CHANGE UP (ref 70–99)
GLUCOSE BLDC GLUCOMTR-MCNC: 97 MG/DL — SIGNIFICANT CHANGE UP (ref 70–99)

## 2020-08-11 RX ADMIN — Medication 3 UNIT(S): at 08:52

## 2020-08-11 RX ADMIN — Medication 600 MILLIGRAM(S): at 17:30

## 2020-08-11 RX ADMIN — Medication 600 MILLIGRAM(S): at 12:40

## 2020-08-11 RX ADMIN — Medication 600 MILLIGRAM(S): at 07:08

## 2020-08-11 RX ADMIN — Medication 600 MILLIGRAM(S): at 06:28

## 2020-08-11 RX ADMIN — Medication 600 MILLIGRAM(S): at 11:50

## 2020-08-11 RX ADMIN — Medication 1 TABLET(S): at 11:50

## 2020-08-11 RX ADMIN — METFORMIN HYDROCHLORIDE 1000 MILLIGRAM(S): 850 TABLET ORAL at 11:50

## 2020-08-11 RX ADMIN — Medication 600 MILLIGRAM(S): at 16:46

## 2020-08-11 NOTE — PROGRESS NOTE ADULT - SUBJECTIVE AND OBJECTIVE BOX
Name: XIN DONG  MRN: [id number]  Date Admitted: 08-10-20  Location: [facility] 2E2012 ([facility] 2EST)  Attending: Jaxson Diaz William      Post Partum: Vaginal Delivery Progress Note    XIN DONG is a 39y  s/p  PPD#1 of a viable female infant.     SUBJECTIVE:  No acute events overnight. Pain is well controlled with PRN pain medication. No problems with ambulating, voiding, or PO intake. Has had flatus but no BM. Denies N/V. Patient is having normal lochia which is decreasing.    She is breastfeeding and the baby is latching on.     OBJECTIVE:    Vital Signs Last 24 Hrs  T(C): 36.6 (11 Aug 2020 04:46), Max: 37.2 (10 Aug 2020 10:22)  T(F): 97.8 (11 Aug 2020 04:46), Max: 99 (10 Aug 2020 10:22)  HR: 79 (11 Aug 2020 04:46) (63 - 94)  BP: 94/58 (11 Aug 2020 04:46) (94/58 - 132/81)  RR: 16 (11 Aug 2020 04:46) (14 - 18)  SpO2: 99% (11 Aug 2020 04:46) (99% - 100%)      Physical exam:  General: AOx3, NAD.  Heart: RRR. S1S2.  Lungs: CTABL. Good airflow bilaterally.   Abdomen: +BS, Soft, appropriately tender to palpitation, firm uterine fundus at umbilicus.  Pelvic: Lochia (normal/minimal/excessive)  Ext: No DVT signs, warm extremities.        LABS:                        13.1   10.95 )-----------( 243      ( 10 Aug 2020 02:26 )             38.9

## 2020-08-11 NOTE — PROGRESS NOTE ADULT - ATTENDING COMMENTS
Patient seen and examined, no complaints, normal lochia  VS and labs reviewed  Abdomen: soft fundus firm/NT  Lower ext: no calf tenderness bilaterally    PPD#1 s/p  -afebrile, stable  doing well, stable for discharge home today  postpartum and f/u instructions reviewed  Christina Klein MD

## 2020-08-11 NOTE — PROGRESS NOTE ADULT - ASSESSMENT
A/P:   XIN DONG is a 39y  s/p  PPD#1 of a viable female infant.  - VSS.   - Pain is well controlled  - Tolerating PO intake  - Normal bowel function  - Bleeding wnl  - Dispo: Home pending attending approval A/P:   XIN DONG is a 39y  s/p  PPD#1 of a viable female infant.  - VSS.   - Pain is well controlled  - Tolerating PO intake  - Normal bowel function  - Bleeding wnl  - Dispo: Home pending attending approval      PGY4 Addendum: Patient seen and examined at bedside. Agree with the above. Will discuss d/c with MD1.

## 2020-08-12 LAB
GLUCOSE BLDC GLUCOMTR-MCNC: 137 MG/DL — HIGH (ref 70–99)
GLUCOSE BLDC GLUCOMTR-MCNC: 65 MG/DL — LOW (ref 70–99)
GLUCOSE BLDC GLUCOMTR-MCNC: 96 MG/DL — SIGNIFICANT CHANGE UP (ref 70–99)

## 2020-08-12 PROCEDURE — 82962 GLUCOSE BLOOD TEST: CPT

## 2020-08-12 PROCEDURE — 59050 FETAL MONITOR W/REPORT: CPT

## 2020-08-12 PROCEDURE — 86900 BLOOD TYPING SEROLOGIC ABO: CPT

## 2020-08-12 PROCEDURE — 83036 HEMOGLOBIN GLYCOSYLATED A1C: CPT

## 2020-08-12 PROCEDURE — G0463: CPT

## 2020-08-12 PROCEDURE — 86780 TREPONEMA PALLIDUM: CPT

## 2020-08-12 PROCEDURE — 36415 COLL VENOUS BLD VENIPUNCTURE: CPT

## 2020-08-12 PROCEDURE — 86850 RBC ANTIBODY SCREEN: CPT

## 2020-08-12 PROCEDURE — 86901 BLOOD TYPING SEROLOGIC RH(D): CPT

## 2020-08-12 PROCEDURE — 87635 SARS-COV-2 COVID-19 AMP PRB: CPT

## 2020-08-12 PROCEDURE — 85027 COMPLETE CBC AUTOMATED: CPT

## 2020-08-12 PROCEDURE — 59025 FETAL NON-STRESS TEST: CPT

## 2020-08-12 PROCEDURE — 80053 COMPREHEN METABOLIC PANEL: CPT

## 2020-08-12 RX ADMIN — Medication 1 TABLET(S): at 12:15

## 2020-08-12 RX ADMIN — METFORMIN HYDROCHLORIDE 1000 MILLIGRAM(S): 850 TABLET ORAL at 09:03

## 2020-08-12 RX ADMIN — Medication 600 MILLIGRAM(S): at 18:21

## 2020-08-12 RX ADMIN — Medication 600 MILLIGRAM(S): at 12:15

## 2020-08-12 RX ADMIN — Medication 600 MILLIGRAM(S): at 12:45

## 2020-08-12 RX ADMIN — Medication 600 MILLIGRAM(S): at 17:58

## 2020-08-12 NOTE — PROGRESS NOTE ADULT - SUBJECTIVE AND OBJECTIVE BOX
Name: XIN DONG  MRN: [id number]  Date Admitted: 08-10-20  Location: [facility] 2E2012 ([facility] 2EST)  Attending: Jaxson Diaz William      Post Partum: Vaginal Delivery Progress Note    XIN DONG is a 39y  s/p  PPD#2 of a viable female infant.     SUBJECTIVE:  No acute events overnight. Pain is well controlled with PRN pain medication. No problems with ambulating, voiding, or PO intake. Has had flatus but no BM. Denies N/V.  Reports passing a clot overnight, but she is having normal lochia which is decreasing.  Denies any dizziness, fevers, chills or sob.   She is breastfeeding and the baby is latching on.     OBJECTIVE:    Vital Signs Last 24 Hrs  T(C): 36.8 (11 Aug 2020 15:55), Max: 36.8 (11 Aug 2020 15:55)  T(F): 98.2 (11 Aug 2020 15:55), Max: 98.2 (11 Aug 2020 15:55)  HR: 70 (11 Aug 2020 15:55) (70 - 70)  BP: 100/60 (11 Aug 2020 15:55) (100/60 - 100/60)  RR: 18 (11 Aug 2020 15:55) (18 - 18)        Physical exam:  General: AOx3, NAD.  Heart: RRR. S1S2.  Lungs: CTABL. Good airflow bilaterally.   Abdomen: +BS, Soft, appropriately tender to palpitation, firm uterine fundus at umbilicus.  Pelvic: Lochia normal  Ext: No DVT signs, warm extremities.        LABS:                        13.1   10.95 )-----------( 243      ( 10 Aug 2020 02:26 )             38.9

## 2020-08-12 NOTE — PROGRESS NOTE ADULT - SUBJECTIVE AND OBJECTIVE BOX
S: Patient doing well. Minimal lochia. No complaints.     O: Vital Signs Last 24 Hrs  T(C): 36.7 (12 Aug 2020 06:44), Max: 36.8 (11 Aug 2020 15:55)  T(F): 98 (12 Aug 2020 06:44), Max: 98.2 (11 Aug 2020 15:55)  HR: 68 (12 Aug 2020 06:44) (68 - 70)  BP: 98/64 (12 Aug 2020 06:44) (98/64 - 100/60)  BP(mean): --  RR: 18 (12 Aug 2020 06:44) (18 - 18)  SpO2: 98% (12 Aug 2020 06:44) (98% - 98%)    Gen: NAD  Breast : breast feeding  Abd: soft, NT, ND, fundus firm below umbilicus  Lochia mild, voiding well  Ext: no tenderness    Labs:           PP # 2 s/p     Doing well.  Discharge home.  Nothing in vagina and no heavy lifting for 6 weeks.   Follow up 6 weeks for post partum visit.  Call office for any fevers, chills, heavy vaginal bleeding, symptoms of depression, or any other concerning symptoms.  Continue motrin 600 mg every 6 hours.

## 2020-08-12 NOTE — PROGRESS NOTE ADULT - ASSESSMENT
A/P:   XIN DONG is a 39y  s/p  PPD#2 of a viable female infant.  - VSS.   - Pain is well controlled  - Tolerating PO intake  - Normal bowel function  - Bleeding wnl  - Dispo: Home pending attending approval A/P:   XIN DONG is a 39y  s/p  PPD#2 of a viable female infant.  - VSS.   - Pain is well controlled  - Tolerating PO intake  - Normal bowel function  - Bleeding wnl  - Dispo: Home pending attending approval        PGY4 Addendum: Patient seen and examined at bedside. Agree with the above

## 2020-08-13 VITALS
TEMPERATURE: 99 F | HEART RATE: 92 BPM | RESPIRATION RATE: 18 BRPM | SYSTOLIC BLOOD PRESSURE: 108 MMHG | OXYGEN SATURATION: 98 % | DIASTOLIC BLOOD PRESSURE: 68 MMHG

## 2020-08-14 ENCOUNTER — APPOINTMENT (OUTPATIENT)
Dept: OBGYN | Facility: CLINIC | Age: 40
End: 2020-08-14

## 2020-08-14 ENCOUNTER — APPOINTMENT (OUTPATIENT)
Dept: ANTEPARTUM | Facility: CLINIC | Age: 40
End: 2020-08-14

## 2020-08-18 ENCOUNTER — APPOINTMENT (OUTPATIENT)
Dept: ANTEPARTUM | Facility: CLINIC | Age: 40
End: 2020-08-18

## 2020-08-18 ENCOUNTER — APPOINTMENT (OUTPATIENT)
Dept: MATERNAL FETAL MEDICINE | Facility: CLINIC | Age: 40
End: 2020-08-18

## 2020-08-19 ENCOUNTER — RX RENEWAL (OUTPATIENT)
Age: 40
End: 2020-08-19

## 2020-08-21 ENCOUNTER — APPOINTMENT (OUTPATIENT)
Dept: ANTEPARTUM | Facility: CLINIC | Age: 40
End: 2020-08-21

## 2020-08-25 ENCOUNTER — APPOINTMENT (OUTPATIENT)
Dept: ANTEPARTUM | Facility: CLINIC | Age: 40
End: 2020-08-25

## 2020-08-28 ENCOUNTER — APPOINTMENT (OUTPATIENT)
Dept: ANTEPARTUM | Facility: CLINIC | Age: 40
End: 2020-08-28

## 2020-09-11 DIAGNOSIS — O09.819 SUPERVISION OF PREGNANCY RESULTING FROM ASSISTED REPRODUCTIVE TECHNOLOGY, UNSPECIFIED TRIMESTER: ICD-10-CM

## 2020-09-11 DIAGNOSIS — Z3A.37 37 WEEKS GESTATION OF PREGNANCY: ICD-10-CM

## 2020-09-11 DIAGNOSIS — O24.113 PRE-EXISTING TYPE 2 DIABETES MELLITUS, IN PREGNANCY, THIRD TRIMESTER: ICD-10-CM

## 2020-09-11 DIAGNOSIS — O09.513 SUPERVISION OF ELDERLY PRIMIGRAVIDA, THIRD TRIMESTER: ICD-10-CM

## 2020-09-11 DIAGNOSIS — Z3A.36 36 WEEKS GESTATION OF PREGNANCY: ICD-10-CM

## 2020-09-17 ENCOUNTER — APPOINTMENT (OUTPATIENT)
Dept: OBGYN | Facility: CLINIC | Age: 40
End: 2020-09-17
Payer: COMMERCIAL

## 2020-09-17 VITALS
DIASTOLIC BLOOD PRESSURE: 83 MMHG | WEIGHT: 158.25 LBS | SYSTOLIC BLOOD PRESSURE: 118 MMHG | BODY MASS INDEX: 26.33 KG/M2

## 2020-09-17 PROCEDURE — 0503F POSTPARTUM CARE VISIT: CPT

## 2020-09-17 NOTE — COUNSELING
[Nutrition/ Exercise/ Weight Management] : nutrition, exercise, weight management [Body Image] : body image [Vitamins/Supplements] : vitamins/supplements [Sunscreen] : sunscreen [Drugs/Alcohol] : drugs, alcohol [Breast Self Exam] : breast self exam [Bladder Hygiene] : bladder hygiene [Pregnancy Options] : pregnancy options [Confidentiality] : confidentiality [STD (testing, results, tx)] : STD (testing, results, tx) [Vaccines] : vaccines [HPV Vaccine] : HPV Vaccine

## 2020-09-18 ENCOUNTER — APPOINTMENT (OUTPATIENT)
Dept: ENDOCRINOLOGY | Facility: CLINIC | Age: 40
End: 2020-09-18
Payer: COMMERCIAL

## 2020-09-18 VITALS
OXYGEN SATURATION: 98 % | HEART RATE: 88 BPM | HEIGHT: 65 IN | SYSTOLIC BLOOD PRESSURE: 110 MMHG | BODY MASS INDEX: 26.33 KG/M2 | DIASTOLIC BLOOD PRESSURE: 80 MMHG | WEIGHT: 158 LBS

## 2020-09-18 PROCEDURE — 36415 COLL VENOUS BLD VENIPUNCTURE: CPT

## 2020-09-18 PROCEDURE — 99213 OFFICE O/P EST LOW 20 MIN: CPT | Mod: 25

## 2020-09-18 PROCEDURE — 82962 GLUCOSE BLOOD TEST: CPT

## 2020-09-18 RX ORDER — FERROUS FUMARATE/ASCORBIC ACID 65MG-25 MG
65-25 TABLET, EXTENDED RELEASE ORAL
Qty: 1 | Refills: 3 | Status: DISCONTINUED | COMMUNITY
Start: 2020-03-05 | End: 2020-09-18

## 2020-09-18 RX ORDER — INSULIN HUMAN 100 [IU]/ML
100 INJECTION, SUSPENSION SUBCUTANEOUS
Qty: 10 | Refills: 1 | Status: DISCONTINUED | COMMUNITY
Start: 2020-02-28 | End: 2020-09-18

## 2020-09-18 RX ORDER — INSULIN LISPRO 100 [IU]/ML
100 INJECTION, SOLUTION INTRAVENOUS; SUBCUTANEOUS
Qty: 1 | Refills: 1 | Status: DISCONTINUED | COMMUNITY
Start: 2020-06-26 | End: 2020-09-18

## 2020-09-18 RX ORDER — CALCIUM CARB/VITAMIN D3/VIT K1 500-100-40
31G X 5/16" TABLET,CHEWABLE ORAL
Qty: 1 | Refills: 3 | Status: DISCONTINUED | COMMUNITY
Start: 2020-05-29 | End: 2020-09-18

## 2020-09-18 NOTE — HISTORY OF PRESENT ILLNESS
[FreeTextEntry1] : 38 yo F with PMH infertility, PCOS, DM2, HLD latent TB post INH\par \par DM2\par Dx 7/2016\par Denies neuropathy\par on metformin 500 1 tab BID \par ophthalmologist visit 7/19\par B 1/2 plate of pasta + egg + water\par L: 1/2 plate + meat  + veg + water\par D: "\par snack: diet lemonade\par delivered 8/10/20\par breastfeeding\par please refer to scanned glucose log \par \par Infertility\par She had been attempting pregnancy for > 2 years\par at initial evaluation she reports cycles had been q 26-29 days\par admits to acne\par denies hirsutism. denies excessive hair loss\par pelvic US 8/25/16 did not reveal PCOM\par normal hysterosalpingogram 2017\par Post IUI July - unsuccessful\par menarche at 12 years old\par GYN: Bennett Woody\par normal thyroid US

## 2020-09-22 LAB — CYTOLOGY CVX/VAG DOC THIN PREP: NORMAL

## 2020-10-01 LAB
25(OH)D3 SERPL-MCNC: 45.3 NG/ML
ALBUMIN SERPL ELPH-MCNC: 4.4 G/DL
ALP BLD-CCNC: 72 U/L
ALT SERPL-CCNC: 13 U/L
ANION GAP SERPL CALC-SCNC: 15 MMOL/L
AST SERPL-CCNC: 18 U/L
BILIRUB SERPL-MCNC: 0.3 MG/DL
BUN SERPL-MCNC: 13 MG/DL
CALCIUM SERPL-MCNC: 9.8 MG/DL
CHLORIDE SERPL-SCNC: 104 MMOL/L
CHOLEST SERPL-MCNC: 228 MG/DL
CHOLEST/HDLC SERPL: 2.3 RATIO
CK SERPL-CCNC: 179 U/L
CO2 SERPL-SCNC: 21 MMOL/L
CREAT SERPL-MCNC: 0.7 MG/DL
DHEA-S SERPL-MCNC: 38.7 UG/DL
ESTRADIOL SERPL-MCNC: 28 PG/ML
FSH SERPL-MCNC: 26.9 IU/L
GLUCOSE BLDC GLUCOMTR-MCNC: 98
GLUCOSE SERPL-MCNC: 92 MG/DL
HDLC SERPL-MCNC: 98 MG/DL
LDLC SERPL CALC-MCNC: 114 MG/DL
LH SERPL-ACNC: 11.7 IU/L
POTASSIUM SERPL-SCNC: 4 MMOL/L
PROT SERPL-MCNC: 7 G/DL
SHBG SERPL-SCNC: 56 NMOL/L
SODIUM SERPL-SCNC: 140 MMOL/L
T4 FREE SERPL-MCNC: 1.2 NG/DL
TESTOST BND SERPL-MCNC: 0.13 NG/DL
TESTOST SERPL-MCNC: <2.5 NG/DL
TESTOSTERONE BIOAVAILABLE: 0.5 NG/DL
TESTOSTERONE TOTAL S: 7.9 NG/DL
TRIGL SERPL-MCNC: 84 MG/DL
TSH SERPL-ACNC: 1.47 UIU/ML

## 2020-10-02 ENCOUNTER — APPOINTMENT (OUTPATIENT)
Dept: OBGYN | Facility: CLINIC | Age: 40
End: 2020-10-02
Payer: COMMERCIAL

## 2020-10-02 VITALS
SYSTOLIC BLOOD PRESSURE: 126 MMHG | BODY MASS INDEX: 26.89 KG/M2 | WEIGHT: 161.56 LBS | DIASTOLIC BLOOD PRESSURE: 83 MMHG

## 2020-10-02 PROCEDURE — 99213 OFFICE O/P EST LOW 20 MIN: CPT

## 2020-10-09 ENCOUNTER — APPOINTMENT (OUTPATIENT)
Dept: OBGYN | Facility: CLINIC | Age: 40
End: 2020-10-09

## 2020-10-16 ENCOUNTER — APPOINTMENT (OUTPATIENT)
Dept: OBGYN | Facility: CLINIC | Age: 40
End: 2020-10-16
Payer: COMMERCIAL

## 2020-10-16 VITALS
HEIGHT: 65 IN | BODY MASS INDEX: 26.82 KG/M2 | DIASTOLIC BLOOD PRESSURE: 90 MMHG | SYSTOLIC BLOOD PRESSURE: 131 MMHG | WEIGHT: 161 LBS

## 2020-10-16 PROCEDURE — 81025 URINE PREGNANCY TEST: CPT

## 2020-10-16 PROCEDURE — 99213 OFFICE O/P EST LOW 20 MIN: CPT

## 2020-10-16 NOTE — COUNSELING
[Nutrition/ Exercise/ Weight Management] : nutrition, exercise, weight management [Body Image] : body image [Sunscreen] : sunscreen [Vitamins/Supplements] : vitamins/supplements [Smoking Cessation] : smoking cessation [Breast Self Exam] : breast self exam [Bladder Hygiene] : bladder hygiene

## 2020-10-19 LAB
HCG UR QL: NEGATIVE
QUALITY CONTROL: YES

## 2020-10-21 ENCOUNTER — APPOINTMENT (OUTPATIENT)
Dept: ORTHOPEDIC SURGERY | Facility: CLINIC | Age: 40
End: 2020-10-21
Payer: COMMERCIAL

## 2020-10-21 VITALS
DIASTOLIC BLOOD PRESSURE: 81 MMHG | SYSTOLIC BLOOD PRESSURE: 127 MMHG | HEIGHT: 65 IN | BODY MASS INDEX: 26.82 KG/M2 | WEIGHT: 161 LBS | HEART RATE: 94 BPM

## 2020-10-21 PROCEDURE — 99204 OFFICE O/P NEW MOD 45 MIN: CPT

## 2020-10-21 PROCEDURE — 99072 ADDL SUPL MATRL&STAF TM PHE: CPT

## 2020-10-21 NOTE — DISCUSSION/SUMMARY
[de-identified] : Discussed findings of today's exam and possible causes of patient's pain.  Educated patient on their most probable diagnosis of left de Quervain's tenosynovitis after birth of her first child.  Reviewed possible courses of treatment, and we collaboratively decided best course of treatment at this time will include conservative management.  Patient already has over-the-counter thumb spica brace, recommend continued use of this brace as much as she can during the day.  Patient states she was given diclofenac gel, recommend using this topical anti-inflammatory 2–3 times a day for at least the next 2 weeks consecutively.  We discussed the role of a cortisone injection, patient would like to defer at this time.  Patient advised therapy is not advised for this overuse repetitive tendinitis problem.  Follow up as needed.  Patient appreciates and agrees with current plan.\par \par This note was generated using dragon medical dictation software.  A reasonable effort has been made for proofreading its contents, but typos may still remain.  If there are any questions or points of clarification needed please notify my office.

## 2020-10-21 NOTE — PHYSICAL EXAM
[de-identified] : Constitutional: Well-nourished, well-developed, No acute distress\par Respiratory:  Good respiratory effort, no SOB\par Lymphatic: No regional lymphadenopathy, no lymphedema\par Psychiatric: Pleasant and normal affect, alert and oriented x3\par Skin: Clean dry and intact B/L UE/LE\par Musculoskeletal: normal except where as noted in regional exam\par \par \par Right Wrist:\par APPEARANCE: no marked deformities, no swelling or malalignment\par POSITIVE TENDERNESS: none\par NONTENDER: snuffbox, scapholunate, DRUJ, TFCC, FCU/FCR, ECU/ECRL/ECRB, radial/ulnar styloid, CMC, and MCP joints. \par ROM: full & painless. \par RESISTIVE TESTING: painless resisted wrist flexion/extension, and radial/ulnar deviation. \par SPECIAL TESTS: neg Finkelstein's. neg Phalen's. neg reverse Phalen's. neg Eng's. neg beth test. neg TFCC grind. neg tinel's at the carpal tunnel\par Vasc: 2+ radial pulse\par Neuro: AIN, PIN, Ulnar nerve intact to motor\par Sensation: Intact to light touch throughout\par B/L Shoulders:  No asymmetry, malalignment, or swelling, Full ROM, 5/5 strength in flexion/ext, Abd/Add, IR/ER, Joints stable\par B/L Elbows:  No asymmetry, malalignment, or swelling, Full ROM, 5/5 strength in flex/ext, pronation/supination, Joints stable\par \par Left Wrist:\par APPEARANCE: no marked deformities, no swelling or malalignment\par POSITIVE TENDERNESS: radial wrist and thumb along compartment of APL and EPB\par NONTENDER: snuffbox, scapholunate, TFCC, FCU/FCR, ECU/ECRL/ECRB, radial styloid, CMC, MCP. \par ROM: full although painful in ulnar deviation, and thumb flexion, otherwise painless. \par RESISTIVE TESTING: Painful resisted radial deviation, thumb extension, and thumb abduction, mild pain with wrist ext, painless resisted flex and ulnar deviation. \par SPECIAL TESTS: + Finkelstein's. neg Phalen's. neg reverse Phalen's. neg Eng's. neg beth test. neg TFCC grind. neg tinel's at the carpal tunnel\par Vasc: 2+ radial pulse\par Neuro: AIN, PIN, Ulnar nerve intact to motor\par Sensation: Intact to light touch throughout\par \par \par

## 2020-10-21 NOTE — HISTORY OF PRESENT ILLNESS
[de-identified] : RHD Patient is here for left wrist pain that began around 2 months ago. She does not recall any inciting injury but she has a new baby at home. She has used Tylenol for pain relief as well as a thumb spica. The pain is along the first digit. Denies N/T/R/Prior injury. She is not currently working. She does not exercise. \par \par The patient's past medical history, past surgical history, medications and allergies were reviewed by me today and documented accordingly. In addition, the patient's family and social history, which were noncontributory to this visit, were reviewed also. Intake form was reviewed. The patient has no family history of arthritis.

## 2020-10-21 NOTE — CONSULT LETTER
[Dear  ___] : Dear  [unfilled], [Consult Letter:] : I had the pleasure of evaluating your patient, [unfilled]. [Please see my note below.] : Please see my note below. [Sincerely,] : Sincerely, [FreeTextEntry2] : PCP [FreeTextEntry3] : Victoriano Sanders DO, ATC\par Primary Care Sports Medicine\par [facility] [place]

## 2020-11-20 ENCOUNTER — APPOINTMENT (OUTPATIENT)
Dept: OBGYN | Facility: CLINIC | Age: 40
End: 2020-11-20
Payer: COMMERCIAL

## 2020-11-20 VITALS
WEIGHT: 166.5 LBS | DIASTOLIC BLOOD PRESSURE: 76 MMHG | TEMPERATURE: 97.9 F | HEIGHT: 65 IN | SYSTOLIC BLOOD PRESSURE: 115 MMHG | BODY MASS INDEX: 27.74 KG/M2

## 2020-11-20 PROCEDURE — 99214 OFFICE O/P EST MOD 30 MIN: CPT

## 2020-11-20 NOTE — PHYSICAL EXAM
[Labia Majora] : normal [Labia Minora] : normal [Scant] : There was scant vaginal bleeding [Normal] : normal [Uterine Adnexae] : normal

## 2020-12-04 ENCOUNTER — OUTPATIENT (OUTPATIENT)
Dept: OUTPATIENT SERVICES | Facility: HOSPITAL | Age: 40
LOS: 1 days | End: 2020-12-04
Payer: COMMERCIAL

## 2020-12-04 ENCOUNTER — APPOINTMENT (OUTPATIENT)
Dept: ULTRASOUND IMAGING | Facility: CLINIC | Age: 40
End: 2020-12-04
Payer: COMMERCIAL

## 2020-12-04 DIAGNOSIS — N92.1 EXCESSIVE AND FREQUENT MENSTRUATION WITH IRREGULAR CYCLE: ICD-10-CM

## 2020-12-04 DIAGNOSIS — Z00.8 ENCOUNTER FOR OTHER GENERAL EXAMINATION: ICD-10-CM

## 2020-12-04 PROCEDURE — 76830 TRANSVAGINAL US NON-OB: CPT

## 2020-12-04 PROCEDURE — 76830 TRANSVAGINAL US NON-OB: CPT | Mod: 26

## 2021-01-15 ENCOUNTER — APPOINTMENT (OUTPATIENT)
Dept: OBGYN | Facility: CLINIC | Age: 41
End: 2021-01-15
Payer: COMMERCIAL

## 2021-01-15 VITALS
DIASTOLIC BLOOD PRESSURE: 78 MMHG | SYSTOLIC BLOOD PRESSURE: 112 MMHG | BODY MASS INDEX: 28.36 KG/M2 | WEIGHT: 170.44 LBS

## 2021-01-15 DIAGNOSIS — Z30.42 ENCOUNTER FOR SURVEILLANCE OF INJECTABLE CONTRACEPTIVE: ICD-10-CM

## 2021-01-15 DIAGNOSIS — N91.1 SECONDARY AMENORRHEA: ICD-10-CM

## 2021-01-15 DIAGNOSIS — Z30.09 ENCOUNTER FOR OTHER GENERAL COUNSELING AND ADVICE ON CONTRACEPTION: ICD-10-CM

## 2021-01-15 LAB
HCG UR QL: NEGATIVE
QUALITY CONTROL: YES

## 2021-01-15 PROCEDURE — 99213 OFFICE O/P EST LOW 20 MIN: CPT

## 2021-01-15 PROCEDURE — 99072 ADDL SUPL MATRL&STAF TM PHE: CPT

## 2021-01-15 PROCEDURE — 81025 URINE PREGNANCY TEST: CPT

## 2021-04-01 ENCOUNTER — APPOINTMENT (OUTPATIENT)
Dept: ENDOCRINOLOGY | Facility: CLINIC | Age: 41
End: 2021-04-01

## 2021-04-29 ENCOUNTER — APPOINTMENT (OUTPATIENT)
Dept: OBGYN | Facility: CLINIC | Age: 41
End: 2021-04-29
Payer: COMMERCIAL

## 2021-04-29 VITALS
HEIGHT: 65 IN | SYSTOLIC BLOOD PRESSURE: 121 MMHG | BODY MASS INDEX: 27.82 KG/M2 | HEART RATE: 90 BPM | DIASTOLIC BLOOD PRESSURE: 73 MMHG | WEIGHT: 167 LBS

## 2021-04-29 PROCEDURE — 99072 ADDL SUPL MATRL&STAF TM PHE: CPT

## 2021-04-29 PROCEDURE — 99213 OFFICE O/P EST LOW 20 MIN: CPT

## 2021-04-29 RX ORDER — PRENATAL VIT NO.130/IRON/FOLIC 27MG-0.8MG
28-0.8 TABLET ORAL DAILY
Qty: 90 | Refills: 3 | Status: ACTIVE | COMMUNITY
Start: 2021-04-29 | End: 1900-01-01

## 2021-05-07 ENCOUNTER — APPOINTMENT (OUTPATIENT)
Dept: INTERNAL MEDICINE | Facility: CLINIC | Age: 41
End: 2021-05-07
Payer: COMMERCIAL

## 2021-05-07 VITALS
SYSTOLIC BLOOD PRESSURE: 104 MMHG | TEMPERATURE: 98 F | BODY MASS INDEX: 27.66 KG/M2 | DIASTOLIC BLOOD PRESSURE: 66 MMHG | HEART RATE: 96 BPM | HEIGHT: 65 IN | WEIGHT: 166 LBS

## 2021-05-07 VITALS
BODY MASS INDEX: 27.62 KG/M2 | TEMPERATURE: 98 F | OXYGEN SATURATION: 99 % | DIASTOLIC BLOOD PRESSURE: 66 MMHG | HEART RATE: 96 BPM | WEIGHT: 166 LBS | SYSTOLIC BLOOD PRESSURE: 104 MMHG

## 2021-05-07 PROCEDURE — 99072 ADDL SUPL MATRL&STAF TM PHE: CPT

## 2021-05-07 PROCEDURE — 99395 PREV VISIT EST AGE 18-39: CPT | Mod: 25

## 2021-05-07 PROCEDURE — G0442 ANNUAL ALCOHOL SCREEN 15 MIN: CPT

## 2021-05-07 PROCEDURE — 36415 COLL VENOUS BLD VENIPUNCTURE: CPT

## 2021-05-07 PROCEDURE — G0444 DEPRESSION SCREEN ANNUAL: CPT

## 2021-05-07 RX ORDER — MEDROXYPROGESTERONE ACETATE 150 MG/ML
150 INJECTION, SUSPENSION INTRAMUSCULAR
Qty: 1 | Refills: 6 | Status: DISCONTINUED | COMMUNITY
Start: 2020-10-02 | End: 2021-05-07

## 2021-05-07 NOTE — ASSESSMENT
[FreeTextEntry1] : \par 41 yo F with recently diagnosed DMII (A1C 11.1% 7/2016 ) presented for CPE \par \par  dx Dequervains Tenosynovitis left wrist and Wrist pain with numbness rt hand- CTS- adviced wrist brace both hands - f/u hand specialist consider injection \par -avoid lifting weights sp baby \par \par  DM -AIC 5.5 7/2020- get AIC \par - continue meds as per endo for tight glycemic control \par - advised to increase exercise to 30 minutes daily , do resistance training 2 x a week 20 minutes \par - Reminded patient to maintain healthy eating habits (avoid sweets, carbs) and to continue exercising as she has been, including honey \par - Continue monitoring sugars\par - F/u appointment in 3 -6 months\par -pneumovax 23 given 9/2019 \par \par  Hyperlipidemia\par - self discontinued statins - refused medications \par - low fat diet reviewed \par \par h/o +PPD since 11/2016 rx \par - finished rx [place]/Pyridoxine\par \par  GERD\par - resolved , stopped PPI \par -Educated patient lifestyle modification, advice to avoid fried food, greasy oily and spicy foods, avoid tomato, orange, lemon , or caffeinated beverages.\par -Avoid reclining upto 3 hours after meals\par \par HCM \par tdap - 7/10/2020 \par PAP- 9/2020\par flu vaccine 2020\par pneumonia 23 vaccine 9/2019\par refused covid vaccine

## 2021-05-07 NOTE — HISTORY OF PRESENT ILLNESS
[de-identified] : 40 y.o. female with h/o diabetes, PCOS, hyperlipidemia presents for CPE \par \par daughter 8 months now \par - working part time [employer] \par \par c/o pain in b/l hand due to lifting baby saw ortho dx Dequervains Tenosynovitis - pt deferred cortisone shot - wearing wrist brace \par \par DM - FBS- 120-140 \par -on metformin 500 am 1000 at night \par -no hypoglycemic episode \par \par

## 2021-05-07 NOTE — HEALTH RISK ASSESSMENT
[Good] : ~his/her~  mood as  good [No] : No [0] : 2) Feeling down, depressed, or hopeless: Not at all (0) [With Significant Other] : lives with significant other [Employed] : employed [] :  [] : No [de-identified] : walking  [AZH1Cueyc] : 0 [Reports changes in hearing] : Reports no changes in hearing [Reports changes in vision] : Reports no changes in vision [Reports changes in dental health] : Reports no changes in dental health

## 2021-05-10 LAB
25(OH)D3 SERPL-MCNC: 46 NG/ML
ALBUMIN SERPL ELPH-MCNC: 4.4 G/DL
ALP BLD-CCNC: 86 U/L
ALT SERPL-CCNC: 9 U/L
ANION GAP SERPL CALC-SCNC: 13 MMOL/L
APPEARANCE: CLEAR
AST SERPL-CCNC: 11 U/L
BASOPHILS # BLD AUTO: 0.05 K/UL
BASOPHILS NFR BLD AUTO: 0.6 %
BILIRUB SERPL-MCNC: 0.4 MG/DL
BILIRUBIN URINE: NEGATIVE
BLOOD URINE: NEGATIVE
BUN SERPL-MCNC: 12 MG/DL
CALCIUM SERPL-MCNC: 9.9 MG/DL
CHLORIDE SERPL-SCNC: 102 MMOL/L
CHOLEST SERPL-MCNC: 195 MG/DL
CO2 SERPL-SCNC: 23 MMOL/L
COLOR: NORMAL
CREAT SERPL-MCNC: 0.76 MG/DL
EOSINOPHIL # BLD AUTO: 0.03 K/UL
EOSINOPHIL NFR BLD AUTO: 0.4 %
ESTIMATED AVERAGE GLUCOSE: 143 MG/DL
GLUCOSE QUALITATIVE U: NEGATIVE
GLUCOSE SERPL-MCNC: 110 MG/DL
HBA1C MFR BLD HPLC: 6.6 %
HCT VFR BLD CALC: 43.9 %
HDLC SERPL-MCNC: 61 MG/DL
HGB BLD-MCNC: 14.3 G/DL
IMM GRANULOCYTES NFR BLD AUTO: 0.4 %
KETONES URINE: NEGATIVE
LDLC SERPL CALC-MCNC: 112 MG/DL
LEUKOCYTE ESTERASE URINE: NEGATIVE
LYMPHOCYTES # BLD AUTO: 3.44 K/UL
LYMPHOCYTES NFR BLD AUTO: 41.7 %
MAN DIFF?: NORMAL
MCHC RBC-ENTMCNC: 28.7 PG
MCHC RBC-ENTMCNC: 32.6 GM/DL
MCV RBC AUTO: 88 FL
MONOCYTES # BLD AUTO: 0.66 K/UL
MONOCYTES NFR BLD AUTO: 8 %
NEUTROPHILS # BLD AUTO: 4.04 K/UL
NEUTROPHILS NFR BLD AUTO: 48.9 %
NITRITE URINE: NEGATIVE
NONHDLC SERPL-MCNC: 134 MG/DL
PH URINE: 6.5
PLATELET # BLD AUTO: 331 K/UL
POTASSIUM SERPL-SCNC: 4 MMOL/L
PROT SERPL-MCNC: 6.9 G/DL
PROTEIN URINE: NEGATIVE
RBC # BLD: 4.99 M/UL
RBC # FLD: 13.1 %
SODIUM SERPL-SCNC: 139 MMOL/L
SPECIFIC GRAVITY URINE: 1.01
TRIGL SERPL-MCNC: 109 MG/DL
TSH SERPL-ACNC: 1.24 UIU/ML
UROBILINOGEN URINE: NORMAL
VIT B12 SERPL-MCNC: 588 PG/ML
WBC # FLD AUTO: 8.25 K/UL

## 2021-06-03 ENCOUNTER — NON-APPOINTMENT (OUTPATIENT)
Age: 41
End: 2021-06-03

## 2021-06-03 ENCOUNTER — APPOINTMENT (OUTPATIENT)
Dept: OPHTHALMOLOGY | Facility: CLINIC | Age: 41
End: 2021-06-03
Payer: COMMERCIAL

## 2021-06-03 PROCEDURE — 92014 COMPRE OPH EXAM EST PT 1/>: CPT

## 2021-06-03 PROCEDURE — 99072 ADDL SUPL MATRL&STAF TM PHE: CPT

## 2021-07-01 ENCOUNTER — APPOINTMENT (OUTPATIENT)
Dept: OBGYN | Facility: CLINIC | Age: 41
End: 2021-07-01
Payer: COMMERCIAL

## 2021-07-01 VITALS
BODY MASS INDEX: 27.9 KG/M2 | SYSTOLIC BLOOD PRESSURE: 120 MMHG | DIASTOLIC BLOOD PRESSURE: 84 MMHG | WEIGHT: 167.44 LBS | HEIGHT: 65 IN

## 2021-07-01 DIAGNOSIS — N92.1 EXCESSIVE AND FREQUENT MENSTRUATION WITH IRREGULAR CYCLE: ICD-10-CM

## 2021-07-01 PROCEDURE — 99213 OFFICE O/P EST LOW 20 MIN: CPT

## 2021-07-01 PROCEDURE — 99072 ADDL SUPL MATRL&STAF TM PHE: CPT

## 2021-08-06 ENCOUNTER — APPOINTMENT (OUTPATIENT)
Dept: OBGYN | Facility: CLINIC | Age: 41
End: 2021-08-06
Payer: COMMERCIAL

## 2021-08-06 VITALS — DIASTOLIC BLOOD PRESSURE: 78 MMHG | HEIGHT: 65 IN | SYSTOLIC BLOOD PRESSURE: 121 MMHG

## 2021-08-06 PROCEDURE — 58558Z: CUSTOM

## 2021-08-06 NOTE — PROCEDURE
[Hysteroscopy] : Hysteroscopy [Time out performed] : Pre-procedure time out performed.  Patient's name, date of birth and procedure confirmed. [Consent Obtained] : Consent obtained [Abnormal uterine bleeding] : abnormal uterine bleeding [Risks] : risks [Benefits] : benefits [Alternatives] : alternatives [Patient] : patient [Infection] : infection [Bleeding] : bleeding [Allergic Reaction] : allergic reaction [Lidocaine___ mL] : [unfilled] ~UmL of lidocaine [flexible] : Using aseptic technique a hysteroscopy was performed using a flexible hysteroscope [Sent to Pathology] : specimen was placed in buffered formalin and sent for pathology [Antibiotics given] : antibiotics not given [Hemostasis obtained] : hemostasis obtained [Tolerated Well] : Patient tolerated the procedure well [Aftercare instructions/regstrictions given and follow-up scheduled] : Aftercare instructions/restrictions given and follow-up scheduled [de-identified] : timeout performed\par under sterile condition and with paracervical block the cervix was dilated and endometrial sampling obtained and sent to pathology\par .Hysteroscopy reveals normal endometrial contour no fibroids or polyps Patient tolerated the procedure well.

## 2021-08-07 LAB
HCG UR QL: NEGATIVE
QUALITY CONTROL: YES

## 2021-08-11 LAB — CORE LAB BIOPSY: NORMAL

## 2021-09-09 ENCOUNTER — APPOINTMENT (OUTPATIENT)
Dept: OBGYN | Facility: CLINIC | Age: 41
End: 2021-09-09
Payer: COMMERCIAL

## 2021-09-09 VITALS
HEART RATE: 78 BPM | BODY MASS INDEX: 27.24 KG/M2 | HEIGHT: 65 IN | SYSTOLIC BLOOD PRESSURE: 114 MMHG | WEIGHT: 163.5 LBS | DIASTOLIC BLOOD PRESSURE: 78 MMHG

## 2021-09-09 DIAGNOSIS — N92.1 EXCESSIVE AND FREQUENT MENSTRUATION WITH IRREGULAR CYCLE: ICD-10-CM

## 2021-09-09 PROCEDURE — 99213 OFFICE O/P EST LOW 20 MIN: CPT

## 2021-09-14 ENCOUNTER — APPOINTMENT (OUTPATIENT)
Dept: INTERNAL MEDICINE | Facility: CLINIC | Age: 41
End: 2021-09-14
Payer: COMMERCIAL

## 2021-09-14 VITALS
TEMPERATURE: 98.3 F | OXYGEN SATURATION: 98 % | DIASTOLIC BLOOD PRESSURE: 64 MMHG | BODY MASS INDEX: 26.66 KG/M2 | SYSTOLIC BLOOD PRESSURE: 122 MMHG | HEIGHT: 65 IN | WEIGHT: 160 LBS | HEART RATE: 74 BPM

## 2021-09-14 DIAGNOSIS — R76.12 NONSPECIFIC REACTION TO CELL MEDIATED IMMUNITY MEASUREMENT OF GAMMA INTERFERON ANTIGEN RESPONSE W/OUT ACTIVE TUBERCULOSIS: ICD-10-CM

## 2021-09-14 PROCEDURE — 90686 IIV4 VACC NO PRSV 0.5 ML IM: CPT

## 2021-09-14 PROCEDURE — 99395 PREV VISIT EST AGE 18-39: CPT | Mod: 25

## 2021-09-14 PROCEDURE — G0008: CPT

## 2021-09-17 LAB
AMPHET UR-MCNC: NEGATIVE
BARBITURATES UR-MCNC: NEGATIVE
BENZODIAZ UR-MCNC: NEGATIVE
COCAINE METAB.OTHER UR-MCNC: NEGATIVE
CREAT SPEC-SCNC: 55 MG/DL
CREATININE, URINE: 55.1 MG/DL
ESTIMATED AVERAGE GLUCOSE: 131 MG/DL
HBA1C MFR BLD HPLC: 6.2 %
M TB IFN-G BLD-IMP: POSITIVE
METHADONE UR-MCNC: NEGATIVE
METHAQUALONE UR-MCNC: NEGATIVE
MICROALBUMIN 24H UR DL<=1MG/L-MCNC: <1.2 MG/DL
MICROALBUMIN/CREAT 24H UR-RTO: NORMAL MG/G
OPIATES UR-MCNC: NEGATIVE
PCP UR-MCNC: NEGATIVE
PROPOXYPH UR QL: NEGATIVE
QUANTIFERON TB PLUS MITOGEN MINUS NIL: 9.12 IU/ML
QUANTIFERON TB PLUS NIL: 0.07 IU/ML
QUANTIFERON TB PLUS TB1 MINUS NIL: 6.12 IU/ML
QUANTIFERON TB PLUS TB2 MINUS NIL: 4.8 IU/ML
THC UR QL: NEGATIVE

## 2021-09-17 NOTE — ASSESSMENT
[FreeTextEntry1] : Dequervains Tenosynovitis\par -resolved \par \par DM -AIC 6.6 5/2021 \par -A1C and microalbumin today\par -continues metformin\par -dietary modification discussed, regular exercise encouraged \par -pneumovax 23 given 9/2019 \par \par  Hyperlipidemia\par - self discontinued statins - refused medications \par - low fat diet reviewed \par \par h/o +PPD since 11/2016\par - finished rx INH/Pyridoxine in 2017\par \par HCM \par tdap - 7/10/2020 \par PAP- 9/2020\par dental UTD\par flu vaccine today\par plans to schedule COVID vaccine\par

## 2021-09-17 NOTE — HISTORY OF PRESENT ILLNESS
[de-identified] : 40 y.o. female with h/o diabetes, PCOS, hyperlipidemia presents for CPE \par \par Feeling well. \par \par Needs form, labs for work - [employer].  \par \par Dequervains Tenosynovitis - feels fine now now that baby is older, less work with her hands/wrists. \par \par Taking metformin, 1,500mg daily.  Fasting  this am, 135 last night.  \par \par

## 2021-09-17 NOTE — HEALTH RISK ASSESSMENT
[Good] : ~his/her~  mood as  good [No] : In the past 12 months have you used drugs other than those required for medical reasons? No [0] : 2) Feeling down, depressed, or hopeless: Not at all (0) [Patient reported PAP Smear was normal] : Patient reported PAP Smear was normal [With Family] : lives with family [Employed] : employed [Fully functional (bathing, dressing, toileting, transferring, walking, feeding)] : Fully functional (bathing, dressing, toileting, transferring, walking, feeding) [Fully functional (using the telephone, shopping, preparing meals, housekeeping, doing laundry, using] : Fully functional and needs no help or supervision to perform IADLs (using the telephone, shopping, preparing meals, housekeeping, doing laundry, using transportation, managing medications and managing finances) [5 or 6 (2 pts)] : 5 or 6 (2  points) [] : No [de-identified] : about 2x/week with walking  [de-identified] : tries to avoid sugars  [IIN6Kydld] : 0 [Reports changes in hearing] : Reports no changes in hearing [Reports changes in vision] : Reports no changes in vision [Reports changes in dental health] : Reports no changes in dental health [PapSmearDate] : 2/2020

## 2021-09-24 ENCOUNTER — APPOINTMENT (OUTPATIENT)
Dept: RADIOLOGY | Facility: CLINIC | Age: 41
End: 2021-09-24
Payer: COMMERCIAL

## 2021-09-24 ENCOUNTER — OUTPATIENT (OUTPATIENT)
Dept: OUTPATIENT SERVICES | Facility: HOSPITAL | Age: 41
LOS: 1 days | End: 2021-09-24
Payer: COMMERCIAL

## 2021-09-24 DIAGNOSIS — Z00.8 ENCOUNTER FOR OTHER GENERAL EXAMINATION: ICD-10-CM

## 2021-09-24 PROCEDURE — 71046 X-RAY EXAM CHEST 2 VIEWS: CPT

## 2021-09-24 PROCEDURE — 71046 X-RAY EXAM CHEST 2 VIEWS: CPT | Mod: 26

## 2021-10-06 PROBLEM — Z53.1 REFUSAL OF BLOOD TRANSFUSIONS AS PATIENT IS JEHOVAH'S WITNESS: Status: ACTIVE | Noted: 2020-06-12

## 2021-10-21 ENCOUNTER — APPOINTMENT (OUTPATIENT)
Dept: OBGYN | Facility: CLINIC | Age: 41
End: 2021-10-21
Payer: COMMERCIAL

## 2021-10-21 VITALS
BODY MASS INDEX: 27.89 KG/M2 | SYSTOLIC BLOOD PRESSURE: 121 MMHG | HEIGHT: 65 IN | WEIGHT: 167.38 LBS | DIASTOLIC BLOOD PRESSURE: 80 MMHG

## 2021-10-21 PROCEDURE — 99395 PREV VISIT EST AGE 18-39: CPT

## 2021-10-27 LAB — CYTOLOGY CVX/VAG DOC THIN PREP: ABNORMAL

## 2021-11-11 ENCOUNTER — APPOINTMENT (OUTPATIENT)
Dept: INTERNAL MEDICINE | Facility: CLINIC | Age: 41
End: 2021-11-11
Payer: COMMERCIAL

## 2021-11-11 VITALS
DIASTOLIC BLOOD PRESSURE: 74 MMHG | HEART RATE: 81 BPM | TEMPERATURE: 98.5 F | OXYGEN SATURATION: 98 % | HEIGHT: 65 IN | BODY MASS INDEX: 28.16 KG/M2 | SYSTOLIC BLOOD PRESSURE: 130 MMHG | WEIGHT: 169 LBS

## 2021-11-11 PROCEDURE — 99214 OFFICE O/P EST MOD 30 MIN: CPT

## 2021-11-11 NOTE — HISTORY OF PRESENT ILLNESS
[de-identified] : 40 y.o. female with h/o diabetes, PCOS, hyperlipidemia presents for f/u on ch medical issues \par \par daughter 15  months now \par - working part time [employer] \par \par seeing Gyn trying for second baby - on clomid \par \par c/o pain in b/l hand due to lifting baby saw ortho dx Dequervains Tenosynovitis - pt deferred cortisone shot - wearing wrist brace \par \par DM - FBS- 120-140 \par -on metformin 1000 am 1000 at night \par -no hypoglycemic episode \par

## 2021-11-11 NOTE — ASSESSMENT
[FreeTextEntry1] : 39 yo F with recently diagnosed DMII (A1C 11.1% 7/2016 ) presented for f/u \par \par  dx Dequervains Tenosynovitis left wrist and Wrist pain with numbness rt hand- CTS- adviced wrist brace both hands - f/u hand specialist consider injection \par -avoid lifting weights sp baby \par \par  DM -AIC 6.2 9/2021 - get AIC \par - continue meds as per endo for tight glycemic control \par - advised to increase exercise to 30 minutes daily , do resistance training 2 x a week 20 minutes \par - Reminded patient to maintain healthy eating habits (avoid sweets, carbs) and to continue exercising as she has been, including honey \par - Continue monitoring sugars\par - F/u appointment in 3 -6 months\par -pneumovax 23 given 9/2019 \par \par  Hyperlipidemia\par - self discontinued statins - refused medications \par - low fat diet reviewed \par \par h/o +PPD since 11/2016 rx \par - finished rx [facility]/Pyridoxine\par - chest xray 9/24/21 clear \par \par  GERD\par - resolved , stopped PPI \par -Educated patient lifestyle modification, advice to avoid fried food, greasy oily and spicy foods, avoid tomato, orange, lemon , or caffeinated beverages.\par -Avoid reclining upto 3 hours after meals\par \par HCM \par tdap - 7/10/2020 \par PAP- 10/21/2021 neg \par flu vaccine-9/14/21 \par pneumonia 23 vaccine 9/2019\par  covid vaccine- Pfizer 10/2/21 completed \par mammo - ordered

## 2021-11-18 ENCOUNTER — APPOINTMENT (OUTPATIENT)
Dept: OBGYN | Facility: CLINIC | Age: 41
End: 2021-11-18
Payer: COMMERCIAL

## 2021-11-18 VITALS
WEIGHT: 167.31 LBS | DIASTOLIC BLOOD PRESSURE: 69 MMHG | HEIGHT: 65 IN | BODY MASS INDEX: 27.88 KG/M2 | SYSTOLIC BLOOD PRESSURE: 116 MMHG

## 2021-11-18 DIAGNOSIS — N92.6 IRREGULAR MENSTRUATION, UNSPECIFIED: ICD-10-CM

## 2021-11-18 PROCEDURE — 99214 OFFICE O/P EST MOD 30 MIN: CPT

## 2021-12-03 ENCOUNTER — RX RENEWAL (OUTPATIENT)
Age: 41
End: 2021-12-03

## 2021-12-13 ENCOUNTER — NON-APPOINTMENT (OUTPATIENT)
Age: 41
End: 2021-12-13

## 2021-12-13 LAB
CHOLEST SERPL-MCNC: 182 MG/DL
ESTIMATED AVERAGE GLUCOSE: 128 MG/DL
HBA1C MFR BLD HPLC: 6.1 %
HDLC SERPL-MCNC: 68 MG/DL
LDLC SERPL CALC-MCNC: 97 MG/DL
NONHDLC SERPL-MCNC: 114 MG/DL
TRIGL SERPL-MCNC: 89 MG/DL

## 2021-12-17 ENCOUNTER — APPOINTMENT (OUTPATIENT)
Dept: OBGYN | Facility: CLINIC | Age: 41
End: 2021-12-17
Payer: COMMERCIAL

## 2021-12-17 VITALS
SYSTOLIC BLOOD PRESSURE: 120 MMHG | HEIGHT: 65 IN | WEIGHT: 164.25 LBS | DIASTOLIC BLOOD PRESSURE: 76 MMHG | BODY MASS INDEX: 27.36 KG/M2

## 2021-12-17 PROCEDURE — 99214 OFFICE O/P EST MOD 30 MIN: CPT

## 2021-12-17 NOTE — HISTORY OF PRESENT ILLNESS
[N] : Patient does not use contraception [Y] : Positive pregnancy history [Regular Cycle Intervals] : periods have been regular [Frequency: Q ___ days] : menstrual periods occur approximately every [unfilled] days [Menarche Age: ____] : age at menarche was [unfilled] [PGxTotal] : 1 [[facility]] : 1 [PGHxPremature] : 0 [PGHxAbortions] : 0 [[facility] Centeriving] : 1 [PGHxABInduced] : 0 [PGHxABSpont] : 0 [PGHxEctopic] : 0 [PGHxMultBirths] : 0

## 2021-12-30 ENCOUNTER — APPOINTMENT (OUTPATIENT)
Dept: MAMMOGRAPHY | Facility: CLINIC | Age: 41
End: 2021-12-30
Payer: COMMERCIAL

## 2021-12-30 ENCOUNTER — OUTPATIENT (OUTPATIENT)
Dept: OUTPATIENT SERVICES | Facility: HOSPITAL | Age: 41
LOS: 1 days | End: 2021-12-30
Payer: COMMERCIAL

## 2021-12-30 ENCOUNTER — RESULT REVIEW (OUTPATIENT)
Age: 41
End: 2021-12-30

## 2021-12-30 DIAGNOSIS — Z00.8 ENCOUNTER FOR OTHER GENERAL EXAMINATION: ICD-10-CM

## 2021-12-30 PROCEDURE — 77063 BREAST TOMOSYNTHESIS BI: CPT

## 2021-12-30 PROCEDURE — 77067 SCR MAMMO BI INCL CAD: CPT | Mod: 26

## 2021-12-30 PROCEDURE — 77063 BREAST TOMOSYNTHESIS BI: CPT | Mod: 26

## 2021-12-30 PROCEDURE — 77067 SCR MAMMO BI INCL CAD: CPT

## 2022-01-05 ENCOUNTER — NON-APPOINTMENT (OUTPATIENT)
Age: 42
End: 2022-01-05

## 2022-01-14 ENCOUNTER — APPOINTMENT (OUTPATIENT)
Dept: OBGYN | Facility: CLINIC | Age: 42
End: 2022-01-14
Payer: COMMERCIAL

## 2022-01-14 VITALS
HEIGHT: 65 IN | WEIGHT: 164.6 LBS | DIASTOLIC BLOOD PRESSURE: 78 MMHG | SYSTOLIC BLOOD PRESSURE: 122 MMHG | BODY MASS INDEX: 27.42 KG/M2

## 2022-01-14 PROCEDURE — 99214 OFFICE O/P EST MOD 30 MIN: CPT

## 2022-01-21 ENCOUNTER — OUTPATIENT (OUTPATIENT)
Dept: OUTPATIENT SERVICES | Facility: HOSPITAL | Age: 42
LOS: 1 days | End: 2022-01-21
Payer: COMMERCIAL

## 2022-01-21 ENCOUNTER — APPOINTMENT (OUTPATIENT)
Dept: ULTRASOUND IMAGING | Facility: CLINIC | Age: 42
End: 2022-01-21
Payer: COMMERCIAL

## 2022-01-21 ENCOUNTER — RESULT REVIEW (OUTPATIENT)
Age: 42
End: 2022-01-21

## 2022-01-21 DIAGNOSIS — Z00.8 ENCOUNTER FOR OTHER GENERAL EXAMINATION: ICD-10-CM

## 2022-01-21 PROCEDURE — 76642 ULTRASOUND BREAST LIMITED: CPT | Mod: 26,50

## 2022-01-21 PROCEDURE — 76642 ULTRASOUND BREAST LIMITED: CPT

## 2022-02-10 ENCOUNTER — APPOINTMENT (OUTPATIENT)
Dept: OBGYN | Facility: CLINIC | Age: 42
End: 2022-02-10
Payer: COMMERCIAL

## 2022-02-10 VITALS
HEART RATE: 94 BPM | HEIGHT: 65 IN | WEIGHT: 164.44 LBS | BODY MASS INDEX: 27.4 KG/M2 | SYSTOLIC BLOOD PRESSURE: 114 MMHG | DIASTOLIC BLOOD PRESSURE: 72 MMHG

## 2022-02-10 PROCEDURE — 99214 OFFICE O/P EST MOD 30 MIN: CPT

## 2022-03-11 ENCOUNTER — APPOINTMENT (OUTPATIENT)
Dept: OBGYN | Facility: CLINIC | Age: 42
End: 2022-03-11
Payer: COMMERCIAL

## 2022-03-11 VITALS
SYSTOLIC BLOOD PRESSURE: 122 MMHG | DIASTOLIC BLOOD PRESSURE: 76 MMHG | WEIGHT: 160 LBS | BODY MASS INDEX: 26.66 KG/M2 | HEIGHT: 65 IN

## 2022-03-11 DIAGNOSIS — N97.9 FEMALE INFERTILITY, UNSPECIFIED: ICD-10-CM

## 2022-03-11 PROCEDURE — 99214 OFFICE O/P EST MOD 30 MIN: CPT

## 2022-04-07 ENCOUNTER — APPOINTMENT (OUTPATIENT)
Dept: OBGYN | Facility: CLINIC | Age: 42
End: 2022-04-07

## 2022-04-17 ENCOUNTER — RX RENEWAL (OUTPATIENT)
Age: 42
End: 2022-04-17

## 2022-05-12 ENCOUNTER — RESULT REVIEW (OUTPATIENT)
Age: 42
End: 2022-05-12

## 2022-05-12 ENCOUNTER — APPOINTMENT (OUTPATIENT)
Dept: INTERNAL MEDICINE | Facility: CLINIC | Age: 42
End: 2022-05-12
Payer: COMMERCIAL

## 2022-05-12 VITALS
TEMPERATURE: 98.1 F | HEIGHT: 65 IN | RESPIRATION RATE: 15 BRPM | OXYGEN SATURATION: 96 % | DIASTOLIC BLOOD PRESSURE: 69 MMHG | HEART RATE: 72 BPM | SYSTOLIC BLOOD PRESSURE: 112 MMHG | BODY MASS INDEX: 26.33 KG/M2 | WEIGHT: 158 LBS

## 2022-05-12 DIAGNOSIS — N63.0 UNSPECIFIED LUMP IN UNSPECIFIED BREAST: ICD-10-CM

## 2022-05-12 DIAGNOSIS — M65.4 RADIAL STYLOID TENOSYNOVITIS [DE QUERVAIN]: ICD-10-CM

## 2022-05-12 PROCEDURE — 83036 HEMOGLOBIN GLYCOSYLATED A1C: CPT | Mod: QW

## 2022-05-12 PROCEDURE — 99214 OFFICE O/P EST MOD 30 MIN: CPT | Mod: 25

## 2022-05-12 NOTE — HISTORY OF PRESENT ILLNESS
[de-identified] : 41 y.o. female with h/o diabetes, PCOS, hyperlipidemia presents for f/u on ch medical issues \par \par daughter - 2 yrs now \par - working part time [employer] \par \par seeing Gyn trying for second baby - on clomid \par \par HX  pain in b/l hand due to lifting baby saw ortho dx Dequervains Tenosynovitis - pt deferred cortisone shot - wearing wrist brace - better now \par \par DM - FBS- 120-140 \par -on metformin 1000 am 1000 at night \par -no hypoglycemic episode \par  last ophtho 6/2021 \par

## 2022-05-12 NOTE — ASSESSMENT
[FreeTextEntry1] : 42 yo F with recently diagnosed DMII (A1C 11.1% 7/2016 ) presented for f/u \par \par  dx Dequervains Tenosynovitis left wrist and Wrist pain with numbness rt hand--resolved \par  CTS- adviced wrist brace both hands - f/u hand specialist consider injection \par -avoid lifting weights sp baby \par \par  DM -AIC 6.2 9/2021 --> 6.1 12/2021 POC - AIC 6.2 stable \par - continue meds metformin 2 gm qd as per endo for tight glycemic control \par - advised to increase exercise to 30 minutes daily , do resistance training 2 x a week 20 minutes \par - Reminded patient to maintain healthy eating habits (avoid sweets, carbs) and to continue exercising as she has been, including honey \par - Continue monitoring sugars\par - F/u appointment in 3 -6 months\par -pneumovax 23 given 9/2019 \par \par  Hyperlipidemia\par - self discontinued statins - refused medications \par - low fat diet reviewed \par \par h/o +PPD since 11/2016 rx \par - finished rx [facility]/Pyridoxine\par - chest xray 9/24/21 clear \par \par  GERD\par - resolved , stopped PPI \par -Educated patient lifestyle modification, advice to avoid fried food, greasy oily and spicy foods, avoid tomato, orange, lemon , or caffeinated beverages.\par -Avoid reclining upto 3 hours after meals\par \par HCM \par tdap - 7/10/2020 \par PAP- 10/21/2021 neg \par flu vaccine-9/14/21 \par pneumonia 23 vaccine 9/2019\par  covid vaccine- Pfizer 10/2/21 completed \par mammo - 12/30/21 bi rad 0 US 1/2022 - bi rad 2 6 month mammo due - 7/22/22 - ordered \par

## 2022-05-16 ENCOUNTER — RESULT CHARGE (OUTPATIENT)
Age: 42
End: 2022-05-16

## 2022-06-17 RX ORDER — CLOMIPHENE CITRATE 50 MG/1
50 TABLET ORAL DAILY
Qty: 14 | Refills: 6 | Status: COMPLETED | COMMUNITY
Start: 2021-10-21 | End: 2022-06-17

## 2022-06-17 RX ORDER — CLOMIPHENE CITRATE 50 MG/1
50 TABLET ORAL DAILY
Qty: 14 | Refills: 6 | Status: COMPLETED | COMMUNITY
Start: 2022-02-10 | End: 2022-06-17

## 2022-06-29 ENCOUNTER — APPOINTMENT (OUTPATIENT)
Dept: HUMAN REPRODUCTION | Facility: CLINIC | Age: 42
End: 2022-06-29

## 2022-06-29 PROCEDURE — 36415 COLL VENOUS BLD VENIPUNCTURE: CPT

## 2022-06-29 PROCEDURE — 99214 OFFICE O/P EST MOD 30 MIN: CPT | Mod: 25

## 2022-07-06 ENCOUNTER — APPOINTMENT (OUTPATIENT)
Dept: HUMAN REPRODUCTION | Facility: CLINIC | Age: 42
End: 2022-07-06
Payer: COMMERCIAL

## 2022-07-06 PROCEDURE — 99213 OFFICE O/P EST LOW 20 MIN: CPT | Mod: 25

## 2022-07-06 PROCEDURE — 99072 ADDL SUPL MATRL&STAF TM PHE: CPT

## 2022-07-06 PROCEDURE — 58999 UNLISTED PX FML GENITAL SYS: CPT

## 2022-07-06 PROCEDURE — 76856 US EXAM PELVIC COMPLETE: CPT

## 2022-07-13 ENCOUNTER — APPOINTMENT (OUTPATIENT)
Dept: HUMAN REPRODUCTION | Facility: CLINIC | Age: 42
End: 2022-07-13

## 2022-07-13 PROCEDURE — 99214 OFFICE O/P EST MOD 30 MIN: CPT | Mod: 95

## 2022-07-22 ENCOUNTER — APPOINTMENT (OUTPATIENT)
Dept: MAMMOGRAPHY | Facility: CLINIC | Age: 42
End: 2022-07-22

## 2022-07-22 ENCOUNTER — RESULT REVIEW (OUTPATIENT)
Age: 42
End: 2022-07-22

## 2022-07-22 ENCOUNTER — OUTPATIENT (OUTPATIENT)
Dept: OUTPATIENT SERVICES | Facility: HOSPITAL | Age: 42
LOS: 1 days | End: 2022-07-22
Payer: COMMERCIAL

## 2022-07-22 DIAGNOSIS — N63.0 UNSPECIFIED LUMP IN UNSPECIFIED BREAST: ICD-10-CM

## 2022-07-22 PROCEDURE — 76642 ULTRASOUND BREAST LIMITED: CPT | Mod: 26,RT

## 2022-07-22 PROCEDURE — G0279: CPT

## 2022-07-22 PROCEDURE — G0279: CPT | Mod: 26

## 2022-07-22 PROCEDURE — 77065 DX MAMMO INCL CAD UNI: CPT

## 2022-07-22 PROCEDURE — 76642 ULTRASOUND BREAST LIMITED: CPT

## 2022-07-22 PROCEDURE — 77065 DX MAMMO INCL CAD UNI: CPT | Mod: 26,RT

## 2022-08-08 ENCOUNTER — APPOINTMENT (OUTPATIENT)
Dept: HUMAN REPRODUCTION | Facility: CLINIC | Age: 42
End: 2022-08-08

## 2022-08-08 PROCEDURE — 76830 TRANSVAGINAL US NON-OB: CPT

## 2022-08-08 PROCEDURE — 36415 COLL VENOUS BLD VENIPUNCTURE: CPT

## 2022-08-08 PROCEDURE — 99213 OFFICE O/P EST LOW 20 MIN: CPT | Mod: 25

## 2022-08-11 ENCOUNTER — APPOINTMENT (OUTPATIENT)
Dept: HUMAN REPRODUCTION | Facility: CLINIC | Age: 42
End: 2022-08-11

## 2022-08-11 PROCEDURE — 76831 ECHO EXAM UTERUS: CPT

## 2022-08-11 PROCEDURE — 99213 OFFICE O/P EST LOW 20 MIN: CPT | Mod: 25

## 2022-08-11 PROCEDURE — 58340 CATHETER FOR HYSTEROGRAPHY: CPT

## 2022-08-17 ENCOUNTER — APPOINTMENT (OUTPATIENT)
Dept: OPHTHALMOLOGY | Facility: CLINIC | Age: 42
End: 2022-08-17

## 2022-08-17 ENCOUNTER — NON-APPOINTMENT (OUTPATIENT)
Age: 42
End: 2022-08-17

## 2022-08-17 PROCEDURE — 92014 COMPRE OPH EXAM EST PT 1/>: CPT

## 2022-08-30 ENCOUNTER — APPOINTMENT (OUTPATIENT)
Dept: HUMAN REPRODUCTION | Facility: CLINIC | Age: 42
End: 2022-08-30

## 2022-08-30 PROCEDURE — 36415 COLL VENOUS BLD VENIPUNCTURE: CPT

## 2022-08-30 PROCEDURE — 99213 OFFICE O/P EST LOW 20 MIN: CPT | Mod: 25

## 2022-08-30 PROCEDURE — 76830 TRANSVAGINAL US NON-OB: CPT

## 2022-09-07 ENCOUNTER — APPOINTMENT (OUTPATIENT)
Dept: HUMAN REPRODUCTION | Facility: CLINIC | Age: 42
End: 2022-09-07

## 2022-09-07 PROCEDURE — 76830 TRANSVAGINAL US NON-OB: CPT

## 2022-09-07 PROCEDURE — 36415 COLL VENOUS BLD VENIPUNCTURE: CPT

## 2022-09-07 PROCEDURE — 99213 OFFICE O/P EST LOW 20 MIN: CPT | Mod: 25

## 2022-09-07 NOTE — DISCHARGE NOTE OB - USE COLD COMPRESSES, OINTMENTS AND PAIN MEDICATIONS FOR RELIEF OF DISCOMFORT AS DIRECTED AND/OR PRESCRIBED BY YOUR HEALTHCARE PROVIDER
Pt is very uncooperative  and swearing to the staff. Refused to recheck BP. Risk and benefits explained but resist not to do it. Statement Selected

## 2022-09-12 ENCOUNTER — APPOINTMENT (OUTPATIENT)
Dept: HUMAN REPRODUCTION | Facility: CLINIC | Age: 42
End: 2022-09-12

## 2022-09-15 ENCOUNTER — APPOINTMENT (OUTPATIENT)
Dept: INTERNAL MEDICINE | Facility: CLINIC | Age: 42
End: 2022-09-15

## 2022-09-15 ENCOUNTER — APPOINTMENT (OUTPATIENT)
Dept: DERMATOLOGY | Facility: CLINIC | Age: 42
End: 2022-09-15

## 2022-09-15 VITALS
SYSTOLIC BLOOD PRESSURE: 116 MMHG | BODY MASS INDEX: 25.99 KG/M2 | HEIGHT: 65 IN | TEMPERATURE: 98.2 F | OXYGEN SATURATION: 97 % | DIASTOLIC BLOOD PRESSURE: 73 MMHG | WEIGHT: 156 LBS | HEART RATE: 87 BPM

## 2022-09-15 DIAGNOSIS — Z23 ENCOUNTER FOR IMMUNIZATION: ICD-10-CM

## 2022-09-15 DIAGNOSIS — M25.532 PAIN IN LEFT WRIST: ICD-10-CM

## 2022-09-15 PROCEDURE — 99204 OFFICE O/P NEW MOD 45 MIN: CPT

## 2022-09-15 PROCEDURE — G0008: CPT

## 2022-09-15 PROCEDURE — 99396 PREV VISIT EST AGE 40-64: CPT | Mod: 25

## 2022-09-15 PROCEDURE — 90686 IIV4 VACC NO PRSV 0.5 ML IM: CPT

## 2022-09-15 PROCEDURE — 36415 COLL VENOUS BLD VENIPUNCTURE: CPT

## 2022-09-15 RX ORDER — METFORMIN HYDROCHLORIDE 1000 MG/1
1000 TABLET, COATED ORAL
Qty: 180 | Refills: 0 | Status: COMPLETED | COMMUNITY
Start: 2022-03-11 | End: 2022-09-15

## 2022-09-15 RX ORDER — AZELAIC ACID 0.15 G/G
15 GEL TOPICAL TWICE DAILY
Qty: 1 | Refills: 3 | Status: ACTIVE | COMMUNITY
Start: 2022-09-15 | End: 1900-01-01

## 2022-09-15 RX ORDER — CLOMIPHENE CITRATE 50 MG/1
50 TABLET ORAL DAILY
Qty: 14 | Refills: 6 | Status: DISCONTINUED | COMMUNITY
Start: 2022-03-11 | End: 2022-09-15

## 2022-09-15 NOTE — HEALTH RISK ASSESSMENT
[Good] : ~his/her~  mood as  good [Never] : Never [No] : In the past 12 months have you used drugs other than those required for medical reasons? 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

## 2022-09-15 NOTE — ASSESSMENT
[FreeTextEntry1] : 40 yo F with recently diagnosed DMII (A1C 11.1% 7/2016 ) presented for CPE \par \par  dx Dequervains Tenosynovitis left wrist and Wrist pain with numbness rt hand--resolved \par  CTS- adviced wrist brace both hands - f/u hand specialist consider injection \par -avoid lifting weights sp baby \par \par  DM -get AIC \par - continue meds metformin 2 gm qd as per endo for tight glycemic control \par - advised to increase exercise to 30 minutes daily , do resistance training 2 x a week 20 minutes \par - Reminded patient to maintain healthy eating habits (avoid sweets, carbs) and to continue exercising as she has been, including honey \par - Continue monitoring sugars\par -ophtho 5/2022 no retinopathy \par - F/u appointment in 3 -6 months\par -pneumovax 23 given 9/2019 \par \par  Hyperlipidemia\par - self discontinued statins - refused medications \par - low fat diet reviewed \par \par h/o +PPD since 11/2016 rx \par - finished rx [facility]/Pyridoxine\par - chest xray 9/24/21 clear \par \par  GERD\par - resolved , stopped PPI takes as needed \par -Educated patient lifestyle modification, advice to avoid fried food, greasy oily and spicy foods, avoid tomato, orange, lemon , or caffeinated beverages.\par -Avoid reclining upto 3 hours after meals\par \par HCM \par tdap - 7/10/2020 \par PAP- 10/21/2021 neg \par flu vaccine-9/15/22 \par pneumonia 23 vaccine 9/2019\par  covid vaccine- Pfizer 10/2/21 completed \par mammo - 12/30/21 bi rad 0 US 1/2022 - bi rad 2 6 month mammo  - 7/22/22 - bi rad 2 - due for annual 12/2022 - referral placed \par . \par

## 2022-09-15 NOTE — HISTORY OF PRESENT ILLNESS
[de-identified] : 41 y.o. female with h/o diabetes, PCOS, hyperlipidemia presents for annual physical \par daughter - 2 yrs now \par - working part time HHA \par \par seeing Gyn trying for second baby - on clomid \par \par HX pain in b/l hand due to lifting baby saw ortho dx Dequervains Tenosynovitis - pt deferred cortisone shot - wearing wrist brace - better now \par \par DM - FBS- 120-140 \par -on metformin 1000 am 1000 at night - was unable to take meds due to insurance issues -just restarted July last week \par -no hypoglycemic episode \par  last ophtho 5/2022 \par  \par

## 2022-09-16 LAB
ALBUMIN SERPL ELPH-MCNC: 4.2 G/DL
ALP BLD-CCNC: 55 U/L
ALT SERPL-CCNC: 10 U/L
ANION GAP SERPL CALC-SCNC: 12 MMOL/L
APPEARANCE: CLEAR
AST SERPL-CCNC: 10 U/L
BASOPHILS # BLD AUTO: 0.04 K/UL
BASOPHILS NFR BLD AUTO: 0.4 %
BILIRUB SERPL-MCNC: 0.3 MG/DL
BILIRUBIN URINE: NEGATIVE
BLOOD URINE: NEGATIVE
BUN SERPL-MCNC: 13 MG/DL
CALCIUM SERPL-MCNC: 9.4 MG/DL
CHLORIDE SERPL-SCNC: 103 MMOL/L
CHOLEST SERPL-MCNC: 211 MG/DL
CO2 SERPL-SCNC: 24 MMOL/L
COLOR: NORMAL
CREAT SERPL-MCNC: 0.61 MG/DL
CREAT SPEC-SCNC: 129 MG/DL
EGFR: 116 ML/MIN/1.73M2
EOSINOPHIL # BLD AUTO: 0.04 K/UL
EOSINOPHIL NFR BLD AUTO: 0.4 %
ESTIMATED AVERAGE GLUCOSE: 134 MG/DL
GLUCOSE QUALITATIVE U: NEGATIVE
GLUCOSE SERPL-MCNC: 99 MG/DL
HBA1C MFR BLD HPLC: 6.3 %
HCT VFR BLD CALC: 41.8 %
HDLC SERPL-MCNC: 77 MG/DL
HGB BLD-MCNC: 13.3 G/DL
IMM GRANULOCYTES NFR BLD AUTO: 0.2 %
KETONES URINE: NORMAL
LDLC SERPL CALC-MCNC: 117 MG/DL
LEUKOCYTE ESTERASE URINE: NEGATIVE
LYMPHOCYTES # BLD AUTO: 3.03 K/UL
LYMPHOCYTES NFR BLD AUTO: 29.9 %
MAN DIFF?: NORMAL
MCHC RBC-ENTMCNC: 29 PG
MCHC RBC-ENTMCNC: 31.8 GM/DL
MCV RBC AUTO: 91.1 FL
MICROALBUMIN 24H UR DL<=1MG/L-MCNC: <1.2 MG/DL
MICROALBUMIN/CREAT 24H UR-RTO: NORMAL MG/G
MONOCYTES # BLD AUTO: 0.61 K/UL
MONOCYTES NFR BLD AUTO: 6 %
NEUTROPHILS # BLD AUTO: 6.41 K/UL
NEUTROPHILS NFR BLD AUTO: 63.1 %
NITRITE URINE: NEGATIVE
NONHDLC SERPL-MCNC: 133 MG/DL
PH URINE: 6
PLATELET # BLD AUTO: 339 K/UL
POTASSIUM SERPL-SCNC: 4.4 MMOL/L
PROT SERPL-MCNC: 6.6 G/DL
PROTEIN URINE: NEGATIVE
RBC # BLD: 4.59 M/UL
RBC # FLD: 13.6 %
SODIUM SERPL-SCNC: 139 MMOL/L
SPECIFIC GRAVITY URINE: 1.02
TRIGL SERPL-MCNC: 80 MG/DL
TSH SERPL-ACNC: 0.91 UIU/ML
UROBILINOGEN URINE: NORMAL
VIT B12 SERPL-MCNC: 283 PG/ML
WBC # FLD AUTO: 10.15 K/UL

## 2022-09-23 ENCOUNTER — APPOINTMENT (OUTPATIENT)
Dept: HUMAN REPRODUCTION | Facility: CLINIC | Age: 42
End: 2022-09-23

## 2022-09-23 PROCEDURE — 36415 COLL VENOUS BLD VENIPUNCTURE: CPT

## 2022-09-23 PROCEDURE — 76830 TRANSVAGINAL US NON-OB: CPT

## 2022-09-23 PROCEDURE — 99213 OFFICE O/P EST LOW 20 MIN: CPT | Mod: 25

## 2022-09-26 NOTE — PROGRESS NOTE ADULT - PROBLEM SELECTOR PROBLEM 1
(normal spontaneous vaginal delivery)
 (normal spontaneous vaginal delivery)
No deformities present

## 2022-09-28 ENCOUNTER — APPOINTMENT (OUTPATIENT)
Dept: HUMAN REPRODUCTION | Facility: CLINIC | Age: 42
End: 2022-09-28

## 2022-09-28 PROCEDURE — 99213 OFFICE O/P EST LOW 20 MIN: CPT | Mod: 25

## 2022-09-28 PROCEDURE — 76830 TRANSVAGINAL US NON-OB: CPT

## 2022-09-28 PROCEDURE — 36415 COLL VENOUS BLD VENIPUNCTURE: CPT

## 2022-09-30 ENCOUNTER — APPOINTMENT (OUTPATIENT)
Dept: HUMAN REPRODUCTION | Facility: CLINIC | Age: 42
End: 2022-09-30

## 2022-09-30 PROCEDURE — 99213 OFFICE O/P EST LOW 20 MIN: CPT | Mod: 25

## 2022-09-30 PROCEDURE — 36415 COLL VENOUS BLD VENIPUNCTURE: CPT

## 2022-09-30 PROCEDURE — 76830 TRANSVAGINAL US NON-OB: CPT

## 2022-10-01 ENCOUNTER — APPOINTMENT (OUTPATIENT)
Dept: HUMAN REPRODUCTION | Facility: CLINIC | Age: 42
End: 2022-10-01

## 2022-10-12 ENCOUNTER — APPOINTMENT (OUTPATIENT)
Dept: HUMAN REPRODUCTION | Facility: CLINIC | Age: 42
End: 2022-10-12

## 2022-10-12 PROCEDURE — 99213 OFFICE O/P EST LOW 20 MIN: CPT | Mod: 25

## 2022-10-12 PROCEDURE — 76830 TRANSVAGINAL US NON-OB: CPT

## 2022-10-12 PROCEDURE — 36415 COLL VENOUS BLD VENIPUNCTURE: CPT

## 2022-10-15 ENCOUNTER — APPOINTMENT (OUTPATIENT)
Dept: HUMAN REPRODUCTION | Facility: CLINIC | Age: 42
End: 2022-10-15

## 2022-10-15 PROCEDURE — 36415 COLL VENOUS BLD VENIPUNCTURE: CPT

## 2022-10-15 PROCEDURE — 76830 TRANSVAGINAL US NON-OB: CPT

## 2022-10-15 PROCEDURE — 99213 OFFICE O/P EST LOW 20 MIN: CPT | Mod: 25

## 2022-10-18 ENCOUNTER — APPOINTMENT (OUTPATIENT)
Dept: HUMAN REPRODUCTION | Facility: CLINIC | Age: 42
End: 2022-10-18

## 2022-10-18 PROCEDURE — 99213 OFFICE O/P EST LOW 20 MIN: CPT | Mod: 25

## 2022-10-18 PROCEDURE — 76830 TRANSVAGINAL US NON-OB: CPT

## 2022-10-18 PROCEDURE — 36415 COLL VENOUS BLD VENIPUNCTURE: CPT

## 2022-10-20 ENCOUNTER — APPOINTMENT (OUTPATIENT)
Dept: HUMAN REPRODUCTION | Facility: CLINIC | Age: 42
End: 2022-10-20

## 2022-10-20 PROCEDURE — 99213 OFFICE O/P EST LOW 20 MIN: CPT | Mod: 25

## 2022-10-20 PROCEDURE — 76830 TRANSVAGINAL US NON-OB: CPT

## 2022-10-20 PROCEDURE — 36415 COLL VENOUS BLD VENIPUNCTURE: CPT

## 2022-10-21 ENCOUNTER — APPOINTMENT (OUTPATIENT)
Dept: HUMAN REPRODUCTION | Facility: CLINIC | Age: 42
End: 2022-10-21

## 2022-10-21 PROCEDURE — 99213 OFFICE O/P EST LOW 20 MIN: CPT | Mod: 25

## 2022-10-21 PROCEDURE — 76830 TRANSVAGINAL US NON-OB: CPT

## 2022-10-21 PROCEDURE — 36415 COLL VENOUS BLD VENIPUNCTURE: CPT

## 2022-10-22 ENCOUNTER — APPOINTMENT (OUTPATIENT)
Dept: HUMAN REPRODUCTION | Facility: CLINIC | Age: 42
End: 2022-10-22

## 2022-10-22 PROCEDURE — 36415 COLL VENOUS BLD VENIPUNCTURE: CPT

## 2022-10-22 PROCEDURE — 99213 OFFICE O/P EST LOW 20 MIN: CPT | Mod: 25

## 2022-10-22 PROCEDURE — 76830 TRANSVAGINAL US NON-OB: CPT

## 2022-10-23 ENCOUNTER — APPOINTMENT (OUTPATIENT)
Dept: HUMAN REPRODUCTION | Facility: CLINIC | Age: 42
End: 2022-10-23

## 2022-10-23 PROCEDURE — 36415 COLL VENOUS BLD VENIPUNCTURE: CPT

## 2022-10-24 ENCOUNTER — APPOINTMENT (OUTPATIENT)
Dept: HUMAN REPRODUCTION | Facility: CLINIC | Age: 42
End: 2022-10-24
Payer: COMMERCIAL

## 2022-10-24 PROCEDURE — 89254 OOCYTE IDENTIFICATION: CPT

## 2022-10-24 PROCEDURE — 76948 ECHO GUIDE OVA ASPIRATION: CPT

## 2022-10-24 PROCEDURE — 58970 RETRIEVAL OF OOCYTE: CPT

## 2022-10-24 PROCEDURE — 89261 SPERM ISOLATION COMPLEX: CPT

## 2022-10-24 PROCEDURE — 89280 ASSIST OOCYTE FERTILIZATION: CPT

## 2022-10-24 PROCEDURE — 89250 CULTR OOCYTE/EMBRYO <4 DAYS: CPT

## 2022-10-27 ENCOUNTER — APPOINTMENT (OUTPATIENT)
Dept: HUMAN REPRODUCTION | Facility: CLINIC | Age: 42
End: 2022-10-27
Payer: COMMERCIAL

## 2022-10-27 PROCEDURE — 58974 EMBRYO TRANSFER INTRAUTERINE: CPT

## 2022-10-27 PROCEDURE — 89398 UNLISTED REPROD MED LAB PROC: CPT

## 2022-10-27 PROCEDURE — 76705 ECHO EXAM OF ABDOMEN: CPT

## 2022-10-27 PROCEDURE — 99072 ADDL SUPL MATRL&STAF TM PHE: CPT

## 2022-10-27 PROCEDURE — 89253 EMBRYO HATCHING: CPT

## 2022-10-27 PROCEDURE — 89255 PREPARE EMBRYO FOR TRANSFER: CPT

## 2022-10-31 ENCOUNTER — APPOINTMENT (OUTPATIENT)
Dept: HUMAN REPRODUCTION | Facility: CLINIC | Age: 42
End: 2022-10-31

## 2022-11-03 ENCOUNTER — APPOINTMENT (OUTPATIENT)
Dept: HUMAN REPRODUCTION | Facility: CLINIC | Age: 42
End: 2022-11-03

## 2022-11-03 PROCEDURE — 36415 COLL VENOUS BLD VENIPUNCTURE: CPT

## 2022-11-03 PROCEDURE — 99211 OFF/OP EST MAY X REQ PHY/QHP: CPT | Mod: 25

## 2022-11-08 ENCOUNTER — APPOINTMENT (OUTPATIENT)
Dept: HUMAN REPRODUCTION | Facility: CLINIC | Age: 42
End: 2022-11-08

## 2022-11-08 PROCEDURE — 99211 OFF/OP EST MAY X REQ PHY/QHP: CPT | Mod: 25

## 2022-11-08 PROCEDURE — 36415 COLL VENOUS BLD VENIPUNCTURE: CPT

## 2022-11-09 ENCOUNTER — APPOINTMENT (OUTPATIENT)
Dept: HUMAN REPRODUCTION | Facility: CLINIC | Age: 42
End: 2022-11-09

## 2022-12-18 ENCOUNTER — RX RENEWAL (OUTPATIENT)
Age: 42
End: 2022-12-18

## 2023-01-12 ENCOUNTER — OUTPATIENT (OUTPATIENT)
Dept: OUTPATIENT SERVICES | Facility: HOSPITAL | Age: 43
LOS: 1 days | End: 2023-01-12
Payer: COMMERCIAL

## 2023-01-12 ENCOUNTER — APPOINTMENT (OUTPATIENT)
Dept: MAMMOGRAPHY | Facility: CLINIC | Age: 43
End: 2023-01-12
Payer: COMMERCIAL

## 2023-01-12 ENCOUNTER — RESULT REVIEW (OUTPATIENT)
Age: 43
End: 2023-01-12

## 2023-01-12 DIAGNOSIS — Z00.8 ENCOUNTER FOR OTHER GENERAL EXAMINATION: ICD-10-CM

## 2023-01-12 DIAGNOSIS — N63.0 UNSPECIFIED LUMP IN UNSPECIFIED BREAST: ICD-10-CM

## 2023-01-12 PROCEDURE — 77067 SCR MAMMO BI INCL CAD: CPT

## 2023-01-12 PROCEDURE — 77067 SCR MAMMO BI INCL CAD: CPT | Mod: 26

## 2023-01-12 PROCEDURE — 77063 BREAST TOMOSYNTHESIS BI: CPT

## 2023-01-12 PROCEDURE — 77063 BREAST TOMOSYNTHESIS BI: CPT | Mod: 26

## 2023-04-14 ENCOUNTER — APPOINTMENT (OUTPATIENT)
Dept: INTERNAL MEDICINE | Facility: CLINIC | Age: 43
End: 2023-04-14
Payer: COMMERCIAL

## 2023-04-14 VITALS
SYSTOLIC BLOOD PRESSURE: 120 MMHG | BODY MASS INDEX: 26.16 KG/M2 | DIASTOLIC BLOOD PRESSURE: 79 MMHG | TEMPERATURE: 97.5 F | HEIGHT: 65 IN | RESPIRATION RATE: 16 BRPM | OXYGEN SATURATION: 98 % | WEIGHT: 157 LBS | HEART RATE: 80 BPM

## 2023-04-14 PROCEDURE — 83036 HEMOGLOBIN GLYCOSYLATED A1C: CPT | Mod: QW

## 2023-04-14 PROCEDURE — 99214 OFFICE O/P EST MOD 30 MIN: CPT | Mod: 25

## 2023-04-14 NOTE — ASSESSMENT
[FreeTextEntry1] : 41 yo F with recently diagnosed DMII (A1C 11.1% 7/2016 ) presented for f/u on ch medical issues \par \par Acne face saw derm 9/15/22 consult reviewed gave clinda and azelaic - better now \par \par  GERD- flared up with coffee and tea - she  will stop caffine beverages - has rx will start omeprazole 20 qd 1-2 week trial the as needed \par -Educated patient lifestyle modification, advice to avoid fried food, greasy oily and spicy foods, avoid tomato, orange, lemon , or caffeinated beverages.\par -Avoid reclining upto 3 hours after meals\par  \par  DM -Aic 6.3 9/2022 -POC  AIC - 6.5 went up - will satrt exercising as weather is better \par - continue meds metformin 2 gm qd as per endo for tight glycemic control rx renewed today \par - advised to increase exercise to 30 minutes daily , do resistance training 2 x a week 20 minutes \par - Reminded patient to maintain healthy eating habits (avoid sweets, carbs) and to continue exercising as she has been, including honey \par - Continue monitoring sugars\par -ophtho 8/2022 no retinopathy \par - F/u appointment in 3 -6 months\par -pneumovax 23 given 9/2019 \par \par  Hyperlipidemia-LDL- 117 9/2022 \par - self discontinued statins - refused medications \par - low fat diet reviewed \par \par PCOD- infertility - only one kid 3 yrs old - trying for IVF - failed 11/2022 unsuccessful - will do another later in few months \par \par dx Dequervains Tenosynovitis left wrist and Wrist pain with numbness rt hand--resolved \par  CTS- adviced wrist brace both hands - f/u hand specialist consider injection \par -avoid lifting weights sp baby \par \par h/o +PPD since 11/2016 rx \par - finished rx [facility]/Pyridoxine\par - chest xray 9/24/21 clear \par \par HCM \par tdap - 7/10/2020 \par PAP- 10/21/2021 neg \par flu vaccine-9/15/22 \par pneumonia 23 vaccine 9/2019\par  covid vaccine- Pfizer 10/2/21 completed \par mammo - 1/12/23 Bi rad 1 reviewed \par

## 2023-04-14 NOTE — HISTORY OF PRESENT ILLNESS
[de-identified] : 42 y.o. female with h/o diabetes, PCOS, hyperlipidemia presents for f/u on ch medical issues \par daughter - 3 yrs now \par - working full time H[employer] \par \par seeing Gyn trying for second baby - on clomid - tried IVF 11/2022 unsuccessful - will do another later in few months \par \par HX pain in b/l hand due to lifting baby saw ortho dx Dequervains Tenosynovitis - pt deferred cortisone shot - wearing wrist brace - better now \par \par DM - FBS- 110-115 -125- 140-145 \par -on metformin 1000 am 1000 at night \par -no hypoglycemic episode \par  last ophtho 8/2022 \par

## 2023-06-29 ENCOUNTER — APPOINTMENT (OUTPATIENT)
Dept: HUMAN REPRODUCTION | Facility: CLINIC | Age: 43
End: 2023-06-29
Payer: COMMERCIAL

## 2023-06-29 PROCEDURE — 99215 OFFICE O/P EST HI 40 MIN: CPT

## 2023-06-29 PROCEDURE — 36415 COLL VENOUS BLD VENIPUNCTURE: CPT

## 2023-09-12 ENCOUNTER — APPOINTMENT (OUTPATIENT)
Dept: HUMAN REPRODUCTION | Facility: CLINIC | Age: 43
End: 2023-09-12
Payer: COMMERCIAL

## 2023-09-12 PROCEDURE — 36415 COLL VENOUS BLD VENIPUNCTURE: CPT

## 2023-09-12 PROCEDURE — 76830 TRANSVAGINAL US NON-OB: CPT

## 2023-09-12 PROCEDURE — 99213 OFFICE O/P EST LOW 20 MIN: CPT | Mod: 25

## 2023-09-15 ENCOUNTER — APPOINTMENT (OUTPATIENT)
Dept: HUMAN REPRODUCTION | Facility: CLINIC | Age: 43
End: 2023-09-15
Payer: COMMERCIAL

## 2023-09-15 PROCEDURE — 36415 COLL VENOUS BLD VENIPUNCTURE: CPT

## 2023-09-15 PROCEDURE — 76857 US EXAM PELVIC LIMITED: CPT

## 2023-09-15 PROCEDURE — 99213 OFFICE O/P EST LOW 20 MIN: CPT | Mod: 25

## 2023-09-21 ENCOUNTER — APPOINTMENT (OUTPATIENT)
Dept: HUMAN REPRODUCTION | Facility: CLINIC | Age: 43
End: 2023-09-21
Payer: COMMERCIAL

## 2023-09-21 PROCEDURE — 76830 TRANSVAGINAL US NON-OB: CPT

## 2023-09-21 PROCEDURE — 99213 OFFICE O/P EST LOW 20 MIN: CPT | Mod: 25

## 2023-09-21 PROCEDURE — 36415 COLL VENOUS BLD VENIPUNCTURE: CPT

## 2023-09-22 ENCOUNTER — APPOINTMENT (OUTPATIENT)
Dept: INTERNAL MEDICINE | Facility: CLINIC | Age: 43
End: 2023-09-22
Payer: COMMERCIAL

## 2023-09-22 VITALS
BODY MASS INDEX: 26.33 KG/M2 | SYSTOLIC BLOOD PRESSURE: 100 MMHG | OXYGEN SATURATION: 99 % | TEMPERATURE: 98 F | WEIGHT: 158 LBS | HEIGHT: 65 IN | HEART RATE: 76 BPM | DIASTOLIC BLOOD PRESSURE: 64 MMHG | RESPIRATION RATE: 15 BRPM

## 2023-09-22 DIAGNOSIS — Z00.00 ENCOUNTER FOR GENERAL ADULT MEDICAL EXAMINATION W/OUT ABNORMAL FINDINGS: ICD-10-CM

## 2023-09-22 PROCEDURE — 36415 COLL VENOUS BLD VENIPUNCTURE: CPT

## 2023-09-22 PROCEDURE — 99396 PREV VISIT EST AGE 40-64: CPT | Mod: 25

## 2023-09-25 ENCOUNTER — APPOINTMENT (OUTPATIENT)
Dept: ENDOCRINOLOGY | Facility: CLINIC | Age: 43
End: 2023-09-25

## 2023-09-25 LAB
ALBUMIN SERPL ELPH-MCNC: 4.1 G/DL
ALP BLD-CCNC: 60 U/L
ALT SERPL-CCNC: 8 U/L
ANION GAP SERPL CALC-SCNC: 11 MMOL/L
APPEARANCE: CLEAR
AST SERPL-CCNC: 14 U/L
BASOPHILS # BLD AUTO: 0.05 K/UL
BASOPHILS NFR BLD AUTO: 0.6 %
BILIRUB SERPL-MCNC: 0.3 MG/DL
BILIRUBIN URINE: NEGATIVE
BLOOD URINE: NEGATIVE
BUN SERPL-MCNC: 10 MG/DL
CALCIUM SERPL-MCNC: 9.4 MG/DL
CHLORIDE SERPL-SCNC: 104 MMOL/L
CHOLEST SERPL-MCNC: 190 MG/DL
CO2 SERPL-SCNC: 26 MMOL/L
COLOR: YELLOW
CREAT SERPL-MCNC: 0.74 MG/DL
EGFR: 104 ML/MIN/1.73M2
EOSINOPHIL # BLD AUTO: 0.06 K/UL
EOSINOPHIL NFR BLD AUTO: 0.7 %
ESTIMATED AVERAGE GLUCOSE: 134 MG/DL
GLUCOSE QUALITATIVE U: NEGATIVE MG/DL
GLUCOSE SERPL-MCNC: 97 MG/DL
HBA1C MFR BLD HPLC: 6.3 %
HCT VFR BLD CALC: 39.8 %
HDLC SERPL-MCNC: 66 MG/DL
HGB BLD-MCNC: 13 G/DL
IMM GRANULOCYTES NFR BLD AUTO: 0.2 %
KETONES URINE: NEGATIVE MG/DL
LDLC SERPL CALC-MCNC: 106 MG/DL
LEUKOCYTE ESTERASE URINE: NEGATIVE
LYMPHOCYTES # BLD AUTO: 3.74 K/UL
LYMPHOCYTES NFR BLD AUTO: 43.8 %
MAN DIFF?: NORMAL
MCHC RBC-ENTMCNC: 28.8 PG
MCHC RBC-ENTMCNC: 32.7 GM/DL
MCV RBC AUTO: 88.2 FL
MONOCYTES # BLD AUTO: 0.6 K/UL
MONOCYTES NFR BLD AUTO: 7 %
NEUTROPHILS # BLD AUTO: 4.06 K/UL
NEUTROPHILS NFR BLD AUTO: 47.7 %
NITRITE URINE: NEGATIVE
NONHDLC SERPL-MCNC: 124 MG/DL
PH URINE: 6.5
PLATELET # BLD AUTO: 299 K/UL
POTASSIUM SERPL-SCNC: 4 MMOL/L
PROT SERPL-MCNC: 6.5 G/DL
PROTEIN URINE: NEGATIVE MG/DL
RBC # BLD: 4.51 M/UL
RBC # FLD: 13.9 %
SODIUM SERPL-SCNC: 141 MMOL/L
SPECIFIC GRAVITY URINE: 1.01
T4 FREE SERPL-MCNC: 1.4 NG/DL
TRIGL SERPL-MCNC: 97 MG/DL
TSH SERPL-ACNC: 1.17 UIU/ML
UROBILINOGEN URINE: 0.2 MG/DL
WBC # FLD AUTO: 8.53 K/UL

## 2023-09-26 ENCOUNTER — APPOINTMENT (OUTPATIENT)
Dept: HUMAN REPRODUCTION | Facility: CLINIC | Age: 43
End: 2023-09-26
Payer: COMMERCIAL

## 2023-09-26 PROCEDURE — 36415 COLL VENOUS BLD VENIPUNCTURE: CPT

## 2023-09-26 PROCEDURE — 99213 OFFICE O/P EST LOW 20 MIN: CPT | Mod: 25

## 2023-09-26 PROCEDURE — 76857 US EXAM PELVIC LIMITED: CPT

## 2023-09-28 ENCOUNTER — APPOINTMENT (OUTPATIENT)
Dept: HUMAN REPRODUCTION | Facility: CLINIC | Age: 43
End: 2023-09-28
Payer: COMMERCIAL

## 2023-09-28 PROCEDURE — 76857 US EXAM PELVIC LIMITED: CPT

## 2023-09-28 PROCEDURE — 36415 COLL VENOUS BLD VENIPUNCTURE: CPT

## 2023-09-28 PROCEDURE — 99213 OFFICE O/P EST LOW 20 MIN: CPT | Mod: 25

## 2023-09-29 ENCOUNTER — APPOINTMENT (OUTPATIENT)
Dept: HUMAN REPRODUCTION | Facility: CLINIC | Age: 43
End: 2023-09-29
Payer: COMMERCIAL

## 2023-09-29 PROCEDURE — 99213 OFFICE O/P EST LOW 20 MIN: CPT | Mod: 25

## 2023-09-29 PROCEDURE — 76857 US EXAM PELVIC LIMITED: CPT

## 2023-09-29 PROCEDURE — 36415 COLL VENOUS BLD VENIPUNCTURE: CPT

## 2023-10-02 ENCOUNTER — RX RENEWAL (OUTPATIENT)
Age: 43
End: 2023-10-02

## 2023-10-11 ENCOUNTER — APPOINTMENT (OUTPATIENT)
Dept: HUMAN REPRODUCTION | Facility: CLINIC | Age: 43
End: 2023-10-11
Payer: COMMERCIAL

## 2023-10-11 PROCEDURE — 99215 OFFICE O/P EST HI 40 MIN: CPT

## 2023-10-23 ENCOUNTER — APPOINTMENT (OUTPATIENT)
Dept: HUMAN REPRODUCTION | Facility: CLINIC | Age: 43
End: 2023-10-23
Payer: COMMERCIAL

## 2023-10-23 PROCEDURE — 36415 COLL VENOUS BLD VENIPUNCTURE: CPT

## 2023-10-23 PROCEDURE — 99213 OFFICE O/P EST LOW 20 MIN: CPT | Mod: 25

## 2023-10-23 PROCEDURE — 76830 TRANSVAGINAL US NON-OB: CPT

## 2023-11-01 ENCOUNTER — APPOINTMENT (OUTPATIENT)
Dept: HUMAN REPRODUCTION | Facility: CLINIC | Age: 43
End: 2023-11-01
Payer: COMMERCIAL

## 2023-11-01 PROCEDURE — 99214 OFFICE O/P EST MOD 30 MIN: CPT | Mod: 25

## 2023-11-01 PROCEDURE — 76857 US EXAM PELVIC LIMITED: CPT

## 2023-11-02 ENCOUNTER — APPOINTMENT (OUTPATIENT)
Dept: HUMAN REPRODUCTION | Facility: CLINIC | Age: 43
End: 2023-11-02
Payer: COMMERCIAL

## 2023-11-02 PROCEDURE — 89261 SPERM ISOLATION COMPLEX: CPT

## 2023-11-02 PROCEDURE — 58322 ARTIFICIAL INSEMINATION: CPT

## 2023-11-09 ENCOUNTER — APPOINTMENT (OUTPATIENT)
Dept: OPHTHALMOLOGY | Facility: CLINIC | Age: 43
End: 2023-11-09
Payer: COMMERCIAL

## 2023-11-09 ENCOUNTER — NON-APPOINTMENT (OUTPATIENT)
Age: 43
End: 2023-11-09

## 2023-11-09 PROCEDURE — 92014 COMPRE OPH EXAM EST PT 1/>: CPT

## 2023-12-14 ENCOUNTER — APPOINTMENT (OUTPATIENT)
Dept: ENDOCRINOLOGY | Facility: CLINIC | Age: 43
End: 2023-12-14
Payer: COMMERCIAL

## 2023-12-14 VITALS
HEART RATE: 91 BPM | BODY MASS INDEX: 26.54 KG/M2 | WEIGHT: 159.31 LBS | TEMPERATURE: 98.2 F | DIASTOLIC BLOOD PRESSURE: 76 MMHG | HEIGHT: 65 IN | SYSTOLIC BLOOD PRESSURE: 108 MMHG | OXYGEN SATURATION: 99 %

## 2023-12-14 DIAGNOSIS — E28.2 POLYCYSTIC OVARIAN SYNDROME: ICD-10-CM

## 2023-12-14 LAB
GLUCOSE BLDC GLUCOMTR-MCNC: 107
HBA1C MFR BLD HPLC: 6.4

## 2023-12-14 PROCEDURE — 83036 HEMOGLOBIN GLYCOSYLATED A1C: CPT | Mod: QW

## 2023-12-14 PROCEDURE — 99204 OFFICE O/P NEW MOD 45 MIN: CPT

## 2023-12-14 PROCEDURE — 82962 GLUCOSE BLOOD TEST: CPT

## 2023-12-14 RX ORDER — LANCETS 33 GAUGE
EACH MISCELLANEOUS
Qty: 180 | Refills: 2 | Status: ACTIVE | COMMUNITY
Start: 2023-12-14 | End: 1900-01-01

## 2023-12-14 RX ORDER — BLOOD SUGAR DIAGNOSTIC
STRIP MISCELLANEOUS
Qty: 2 | Refills: 3 | Status: ACTIVE | COMMUNITY
Start: 2017-02-15 | End: 1900-01-01

## 2023-12-14 RX ORDER — GLUCOSAM/CHON-MSM1/C/MANG/BOSW 500-416.6
TABLET ORAL
Qty: 1 | Refills: 0 | Status: ACTIVE | COMMUNITY
Start: 2023-12-14 | End: 1900-01-01

## 2023-12-14 RX ORDER — BLOOD-GLUCOSE METER
70 EACH MISCELLANEOUS
Qty: 3 | Refills: 3 | Status: ACTIVE | COMMUNITY
Start: 2023-12-14 | End: 1900-01-01

## 2023-12-14 NOTE — PHYSICAL EXAM
[Alert] : alert [Well Nourished] : well nourished [No Acute Distress] : no acute distress [Well Developed] : well developed [Normal Sclera/Conjunctiva] : normal sclera/conjunctiva [EOMI] : extra ocular movement intact [No Proptosis] : no proptosis [Normal Oropharynx] : the oropharynx was normal [Supple] : the neck was supple [Thyroid Not Enlarged] : the thyroid was not enlarged [No Respiratory Distress] : no respiratory distress [No Accessory Muscle Use] : no accessory muscle use [Clear to Auscultation] : lungs were clear to auscultation bilaterally [Normal S1, S2] : normal S1 and S2 [Normal Rate] : heart rate was normal [Regular Rhythm] : with a regular rhythm [No Edema] : no peripheral edema [Pedal Pulses Normal] : the pedal pulses are present [Normal Bowel Sounds] : normal bowel sounds [Not Tender] : non-tender [Not Distended] : not distended [Soft] : abdomen soft [Normal Anterior Cervical Nodes] : no anterior cervical lymphadenopathy [Normal Posterior Cervical Nodes] : no posterior cervical lymphadenopathy [No Spinal Tenderness] : no spinal tenderness [Spine Straight] : spine straight [No Stigmata of Cushings Syndrome] : no stigmata of Cushings Syndrome [Normal Gait] : normal gait [Normal Strength/Tone] : muscle strength and tone were normal [No Rash] : no rash [Acne] : acne present [Normal Reflexes] : deep tendon reflexes were 2+ and symmetric [No Tremors] : no tremors [Oriented x3] : oriented to person, place, and time [Acanthosis Nigricans] : no acanthosis nigricans

## 2023-12-14 NOTE — HISTORY OF PRESENT ILLNESS
[FreeTextEntry1] : 42 yearF here for assessment for Type 2 diabetes mellitus, PCOS.    last A1c: 6.3%   Patient with past medical history as below, remarkable for secondary infertility, prior IVF, GERD, acne   Thirst and frequent urination:  no Dry skin:  no Vision problems: no High blood pressure:  no Extreme hunger: no Frequent and/or recurring urinary infections: no Skin infections:  no  Slow healing of cuts: no     Screening Ophthalmology: follows LDL: 106 Microalbumin: Cr: -ve EGFR: 104     Current diabetic medication regimen (verified with patient):  metformin 1 g po bid     SMBG ranges (glucometer): reported   Morning fasting:  <130mg/dl    PCOS:    Intermenstrual interval: 25-30 days Menstrual Periods/ year:  12    Hormonal Contraceptives:  No  Desires pregnancy soon: yes, required IVF prior, last IVF/ IUI unsuccessful.  Abnormal Hair growth: No  Hair loss (scalp):  No  Acne: Yes, face Diabetes Mellitus: Yes   No galactorrhea

## 2023-12-18 ENCOUNTER — NON-APPOINTMENT (OUTPATIENT)
Age: 43
End: 2023-12-18

## 2023-12-18 LAB
ALBUMIN SERPL ELPH-MCNC: 4.2 G/DL
CALCIUM SERPL-MCNC: 9.2 MG/DL
CORTIS SERPL-MCNC: 9.3 UG/DL
ESTIMATED AVERAGE GLUCOSE: 140 MG/DL
ESTRADIOL SERPL-MCNC: 409 PG/ML
FSH SERPL-MCNC: 3 IU/L
HBA1C MFR BLD HPLC: 6.5 %
HCG SERPL-MCNC: <1 MIU/ML
LH SERPL-ACNC: 5.7 IU/L
PROLACTIN SERPL-MCNC: 14.4 NG/ML
TESTOST FREE SERPL-MCNC: 1.6 PG/ML
TESTOST SERPL-MCNC: 13.2 NG/DL

## 2023-12-21 LAB — DHEA-SULFATE, SERUM: 64 UG/DL

## 2024-01-19 ENCOUNTER — OUTPATIENT (OUTPATIENT)
Dept: OUTPATIENT SERVICES | Facility: HOSPITAL | Age: 44
LOS: 1 days | End: 2024-01-19
Payer: COMMERCIAL

## 2024-01-19 ENCOUNTER — RESULT REVIEW (OUTPATIENT)
Age: 44
End: 2024-01-19

## 2024-01-19 ENCOUNTER — APPOINTMENT (OUTPATIENT)
Dept: MAMMOGRAPHY | Facility: CLINIC | Age: 44
End: 2024-01-19
Payer: COMMERCIAL

## 2024-01-19 DIAGNOSIS — Z00.00 ENCOUNTER FOR GENERAL ADULT MEDICAL EXAMINATION WITHOUT ABNORMAL FINDINGS: ICD-10-CM

## 2024-01-19 PROCEDURE — 77063 BREAST TOMOSYNTHESIS BI: CPT | Mod: 26

## 2024-01-19 PROCEDURE — 77067 SCR MAMMO BI INCL CAD: CPT | Mod: 26

## 2024-01-19 PROCEDURE — 77067 SCR MAMMO BI INCL CAD: CPT

## 2024-01-19 PROCEDURE — 77063 BREAST TOMOSYNTHESIS BI: CPT

## 2024-03-26 ENCOUNTER — RX RENEWAL (OUTPATIENT)
Age: 44
End: 2024-03-26

## 2024-04-26 ENCOUNTER — APPOINTMENT (OUTPATIENT)
Dept: INTERNAL MEDICINE | Facility: CLINIC | Age: 44
End: 2024-04-26
Payer: COMMERCIAL

## 2024-04-26 VITALS
OXYGEN SATURATION: 98 % | WEIGHT: 164 LBS | DIASTOLIC BLOOD PRESSURE: 71 MMHG | HEIGHT: 65 IN | SYSTOLIC BLOOD PRESSURE: 123 MMHG | HEART RATE: 92 BPM | BODY MASS INDEX: 27.32 KG/M2 | RESPIRATION RATE: 16 BRPM | TEMPERATURE: 98 F

## 2024-04-26 DIAGNOSIS — M62.838 OTHER MUSCLE SPASM: ICD-10-CM

## 2024-04-26 DIAGNOSIS — K21.9 GASTRO-ESOPHAGEAL REFLUX DISEASE W/OUT ESOPHAGITIS: ICD-10-CM

## 2024-04-26 DIAGNOSIS — E11.9 TYPE 2 DIABETES MELLITUS W/OUT COMPLICATIONS: ICD-10-CM

## 2024-04-26 DIAGNOSIS — L70.0 ACNE VULGARIS: ICD-10-CM

## 2024-04-26 DIAGNOSIS — M85.641 OTHER CYST OF BONE, RIGHT HAND: ICD-10-CM

## 2024-04-26 PROCEDURE — 99214 OFFICE O/P EST MOD 30 MIN: CPT

## 2024-04-26 PROCEDURE — 36415 COLL VENOUS BLD VENIPUNCTURE: CPT

## 2024-04-26 RX ORDER — NAPROXEN 500 MG/1
500 TABLET ORAL
Qty: 14 | Refills: 0 | Status: ACTIVE | COMMUNITY
Start: 2024-04-26 | End: 1900-01-01

## 2024-04-26 RX ORDER — CYCLOBENZAPRINE HYDROCHLORIDE 5 MG/1
5 TABLET, FILM COATED ORAL
Qty: 7 | Refills: 0 | Status: ACTIVE | COMMUNITY
Start: 2024-04-26 | End: 1900-01-01

## 2024-04-26 RX ORDER — BENZOYL PEROXIDE 5 G/100G
5 GEL TOPICAL TWICE DAILY
Qty: 2 | Refills: 0 | Status: ACTIVE | COMMUNITY
Start: 2024-04-26 | End: 1900-01-01

## 2024-04-26 RX ORDER — CLINDAMYCIN PHOSPHATE 10 MG/ML
1 LOTION TOPICAL TWICE DAILY
Qty: 1 | Refills: 0 | Status: DISCONTINUED | COMMUNITY
Start: 2022-09-15 | End: 2024-04-26

## 2024-04-26 NOTE — HISTORY OF PRESENT ILLNESS
[de-identified] : 43 y.o. female with h/o diabetes, PCOS, hyperlipidemia presents for F/u on ch issues  daughter - 3 yrs now - has enlarged tonsils looking for surgery  - working full time [employer]   c/o upper back spasms - pain since 1 week -does wors at [facility] mighthave moved a patient pulled muscle   seeing Gyn trying for second baby - on clomid - tried IVF 11/2022 unsuccessful - will do another later in few months -wants referral for endo -did 3 IVF so far no success  -taking 2 medications -clomid for twice daily 5 days and menopur 75 IU qd for 5 days , Gonalf 75 IU qd 5 days   HX pain in b/l hand due to lifting baby saw ortho dx Dequervains Tenosynovitis - pt deferred cortisone shot - wearing wrist brace - better now   DM - FBS- 110-115 -125- 140-145  -on metformin 1000 am 1000 at night  -no hypoglycemic episode   last ophtho 7/2023

## 2024-04-26 NOTE — ASSESSMENT
[FreeTextEntry1] : 42 yo F with recently diagnosed DMII (A1C 11.1% 7/2016 ) presented for f/u on ch issues   left upper back muscle spasm   - advised to avoid pulling pushing , lifting , carrying weights , bending etc  - do warm compresses  - start Flexeril 5 mg qhs as needed - no driving  -naproxen 500 po BID with food  - side effects reviewed  - rtc if no improvement  Acne face saw derm 9/15/22 consult reviewed gave clinda and azelaic - not working trial of benzoly peroxide 5 % BID adv use sunscreen   rt hand bone Cyst palm - hand sp eval    GERD- better now  -flared up with coffee and tea - she stop caffine beverages -helped a lot  -Educated patient lifestyle modification, advice to avoid fried food, greasy oily and spicy foods, avoid tomato, orange, lemon , or caffeinated beverages. -Avoid reclining upto 3 hours after meals    DM -Aic- 6.5 12/2023 went up get AIC  - continue meds metformin 2 gm qd as per endo for tight glycemic control rx renewed today  - advised to increase exercise to 30 minutes daily , do resistance training 2 x a week 20 minutes  - Reminded patient to maintain healthy eating habits (avoid sweets, carbs) and to continue exercising as she has been, including honey  - Continue monitoring sugars -ophtho 7/2023  no retinopathy  - F/u appointment in 3 -6 months -pneumovax 23 given 9/2019    Hyperlipidemia-LDL- 106 9/23  - self discontinued statins - refused medications  - low fat diet reviewed   PCOD- infertility - only one kid 3 yrs old - trying for IVF - failed 11/2022 unsuccessful - will do another later in few months -wants referral for endo -did 3 IVF so far no success  -taking 2 medications -clomid for twice daily 5 days and menopur 75 IU qd for 5 days , Gonalf 75 IU qd 5 days  -endo referral placed   dx Dequervains Tenosynovitis left wrist and Wrist pain with numbness rt hand--resolved   CTS- adviced wrist brace both hands - f/u hand specialist consider injection  -avoid lifting weights sp baby   h/o +PPD since 11/2016 rx  - finished rx [facility]/Pyridoxine - chest xray 9/24/21 clear   HCM  tdap - 7/10/2020  PAP- 10/21/2021 neg  flu vaccine-9/15/22 -pt refused  pneumonia 23 vaccine 9/2019  covid vaccine- Pfizer 10/2/21 completed  mammo - 1/19/24  Bi rad 2 reviewed

## 2024-04-29 LAB
ALBUMIN SERPL ELPH-MCNC: 4.2 G/DL
ALP BLD-CCNC: 67 U/L
ALT SERPL-CCNC: 10 U/L
ANION GAP SERPL CALC-SCNC: 13 MMOL/L
AST SERPL-CCNC: 13 U/L
BILIRUB SERPL-MCNC: 0.4 MG/DL
BUN SERPL-MCNC: 7 MG/DL
CALCIUM SERPL-MCNC: 9.3 MG/DL
CHLORIDE SERPL-SCNC: 101 MMOL/L
CO2 SERPL-SCNC: 24 MMOL/L
CREAT SERPL-MCNC: 0.63 MG/DL
CREAT SPEC-SCNC: 100 MG/DL
EGFR: 114 ML/MIN/1.73M2
ESTIMATED AVERAGE GLUCOSE: 146 MG/DL
GLUCOSE SERPL-MCNC: 115 MG/DL
HBA1C MFR BLD HPLC: 6.7 %
MICROALBUMIN 24H UR DL<=1MG/L-MCNC: <1.2 MG/DL
MICROALBUMIN/CREAT 24H UR-RTO: NORMAL MG/G
POTASSIUM SERPL-SCNC: 4.3 MMOL/L
PROT SERPL-MCNC: 7 G/DL
SODIUM SERPL-SCNC: 138 MMOL/L

## 2024-05-13 ENCOUNTER — APPOINTMENT (OUTPATIENT)
Dept: ORTHOPEDIC SURGERY | Facility: CLINIC | Age: 44
End: 2024-05-13
Payer: COMMERCIAL

## 2024-05-13 VITALS — HEIGHT: 65 IN | WEIGHT: 164 LBS | BODY MASS INDEX: 27.32 KG/M2

## 2024-05-13 DIAGNOSIS — M65.341 TRIGGER FINGER, RIGHT RING FINGER: ICD-10-CM

## 2024-05-13 PROCEDURE — J3490M: CUSTOM

## 2024-05-13 PROCEDURE — 20550 NJX 1 TENDON SHEATH/LIGAMENT: CPT | Mod: RT

## 2024-05-13 PROCEDURE — 99213 OFFICE O/P EST LOW 20 MIN: CPT | Mod: 25

## 2024-05-13 NOTE — HISTORY OF PRESENT ILLNESS
[de-identified] : R hand pain for a month  R RF stiffness, locking [4] : 4 [Dull/Aching] : dull/aching [] : no [FreeTextEntry1] : R hand [FreeTextEntry5] : no injury pain in palm for few weeks

## 2024-05-13 NOTE — PHYSICAL EXAM
[de-identified] : R hand:  Mild swelling  Tender 4th A1 pulley  Decreased ring ROM  +ring triggering

## 2024-05-13 NOTE — ASSESSMENT
[FreeTextEntry1] : R RF Trigger finger tendon sheath injection was performed because of pain inflammation and stiffness Anesthesia: ethyl chloride sprayed topically Celestone 6mg: An injection of Celestone 1cc Lidocaine: An injection of Lidocaine 1% 1cc Marcaine: An injection of Marcaine 0.5% 1cc   Patient has tried OTC's including aspirin, Ibuprofen, Aleve etc or prescription NSAIDS, and/or exercises at home and/ or physical therapy without satisfactory response. After verbal consent using sterile preparation and technique. The risks, benefits, and alternatives to cortisone injection were explained in full to the patient. Risks outlined include but are not limited to infection, sepsis, bleeding, scarring, skin discoloration, temporary increase in pain, syncopal episode, failure to resolve symptoms, allergic reaction, symptom recurrence, and elevation of blood sugar in diabetics. Patient understood the risks. All questions were answered. After discussion of options, patient requested an injection. Oral informed consent was obtained and sterile prep was done of the injection site. Sterile technique was utilized for the procedure including the preparation of the solutions used for the injection. Patient tolerated the procedure well. Advised to ice the injection site this evening. Prep with betadine locally to site. Sterile technique used

## 2024-06-06 ENCOUNTER — APPOINTMENT (OUTPATIENT)
Dept: ENDOCRINOLOGY | Facility: CLINIC | Age: 44
End: 2024-06-06

## 2024-07-25 NOTE — OB RN TRIAGE NOTE - NSLDARRIVAL_OBGYN_ALL_OB_DIFF_HHMM
Refill Request     CONFIRM preferred pharmacy with the patient.    If Mail Order Rx - Pend for 90 day refill.      Last Seen: Last Seen Department: 3/11/2024  Last Seen by PCP: 3/11/2024    Last Written: 2022 3 each 5 refills     If no future appointment scheduled:  Review the last OV with PCP and review information for follow-up visit,  Route STAFF MESSAGE with patient name to the  Pool for scheduling with the following information:            -  Timing of next visit           -  Visit type ie Physical, OV, etc           -  Diagnoses/Reason ie. COPD, HTN - Do not use MEDICATION, Follow-up or CHECK UP - Give reason for visit      Next Appointment:   Future Appointments   Date Time Provider Department Center   2024  8:00 AM Dom Yanes, DO [facility]       Message sent to  to schedule appt with patient?  NO      Requested Prescriptions     Pending Prescriptions Disp Refills    Continuous Glucose Sensor (FREESTYLE GABRIEL 2 SENSOR) MISC 3 each 5     Si Device by Does not apply route every 14 days       
1 Hour(s) 39 Minute(s)

## 2024-08-05 ENCOUNTER — APPOINTMENT (OUTPATIENT)
Dept: INTERNAL MEDICINE | Facility: CLINIC | Age: 44
End: 2024-08-05

## 2024-08-05 PROBLEM — M54.2 NECK PAIN: Status: ACTIVE | Noted: 2024-08-05

## 2024-08-05 PROBLEM — M54.9 UPPER BACK PAIN ON LEFT SIDE: Status: ACTIVE | Noted: 2024-08-05

## 2024-08-05 PROCEDURE — 99214 OFFICE O/P EST MOD 30 MIN: CPT

## 2024-08-05 PROCEDURE — 36415 COLL VENOUS BLD VENIPUNCTURE: CPT

## 2024-08-05 NOTE — HISTORY OF PRESENT ILLNESS
[de-identified] : 43 y.o. female with h/o diabetes, PCOS, hyperlipidemia presents for F/u on ch issues  daughter - 3 yrs now - has enlarged tonsils looking for surgery  - working full time [employer]   c/o upper back spasms - pain still ongoing on and off  -does wors at [facility] mighthave moved a patient pulled muscle -did warm compresses - took tylenol little help   seeing Gyn trying for second baby - on clomid - tried IVF 11/2022 unsuccessful - will do another later in few months -wants referral for endo -did 3 IVF so far no success  -taking 2 medications -clomid for twice daily 5 days and menopur 75 IU qd for 5 days , Gonalf 75 IU qd 5 days   HX pain in b/l hand due to lifting baby saw ortho dx Dequervains Tenosynovitis - pt deferred cortisone shot - wearing wrist brace - better now   DM - FBS- 110-115 -125- 140-145  -on metformin 1000 am 1000 at night  -no hypoglycemic episode   last ophtho 7/2023

## 2024-08-05 NOTE — ASSESSMENT
[FreeTextEntry1] : 44 yo F with recently diagnosed DMII (A1C 11.1% 7/2016 ) presented for f/u on ch issues   forms for work done   left upper back muscle spasm  -pt referral given  - advised to avoid pulling pushing , lifting , carrying weights , bending etc  - do warm compresses  - start Flexeril 5 mg qhs as needed - no driving  - side effects reviewed  - rtc if no improvement  Acne face saw derm 9/15/22 consult reviewed gave clinda and azelaic - not working trial of benzoly peroxide 5 % BID adv use sunscreen   rt hand bone Cyst palm - hand sp eval    GERD- better now  -flared up with coffee and tea - she stop caffine beverages -helped a lot  -Educated patient lifestyle modification, advice to avoid fried food, greasy oily and spicy foods, avoid tomato, orange, lemon , or caffeinated beverages. -Avoid reclining upto 3 hours after meals    DM -Aic- 6.5 12/2023--> 6.7 4/24 went up get AIC  - continue meds metformin 2 gm qd as per endo for tight glycemic control rx renewed today  - advised to increase exercise to 30 minutes daily , do resistance training 2 x a week 20 minutes  - Reminded patient to maintain healthy eating habits (avoid sweets, carbs) and to continue exercising as she has been, including honey  - Continue monitoring sugars -ophtho 11/9 /2023  no retinopathy  - F/u appointment in 3 -6 months -pneumovax 23 given 9/2019    Hyperlipidemia-LDL- 106 9/23  - self discontinued statins - refused medications  - low fat diet reviewed   PCOD- infertility - only one kid 3 yrs old - trying for IVF - failed 11/2022 unsuccessful - will do another later in few months -wants referral for endo -did 3 IVF so far no success  -taking 2 medications -clomid for twice daily 5 days and menopur 75 IU qd for 5 days , Gonalf 75 IU qd 5 days  -endo referral placed   dx Dequervains Tenosynovitis left wrist and Wrist pain with numbness rt hand--resolved   CTS- adviced wrist brace both hands - f/u hand specialist consider injection  -avoid lifting weights sp baby   h/o +PPD since 11/2016 rx  - finished rx NH/Pyridoxine - chest xray 9/24/21 clear   HCM  tdap - 7/10/2020  PAP- 10/21/2021 neg has appt 8/22/24  flu vaccine-9/15/22 -pt refused  pneumonia 23 vaccine 9/2019  covid vaccine- Pfizer 10/2/21 completed  Mammo - 1/19/24  Bi rad 2 reviewed

## 2024-09-06 ENCOUNTER — APPOINTMENT (OUTPATIENT)
Dept: INTERNAL MEDICINE | Facility: CLINIC | Age: 44
End: 2024-09-06

## 2024-09-06 VITALS
OXYGEN SATURATION: 99 % | HEIGHT: 65 IN | HEART RATE: 106 BPM | TEMPERATURE: 98 F | RESPIRATION RATE: 17 BRPM | WEIGHT: 168 LBS | DIASTOLIC BLOOD PRESSURE: 78 MMHG | BODY MASS INDEX: 27.99 KG/M2 | SYSTOLIC BLOOD PRESSURE: 135 MMHG

## 2024-09-06 NOTE — HISTORY OF PRESENT ILLNESS
[de-identified] : 43 y.o. female with h/o diabetes, PCOS, hyperlipidemia presents for F/u on ch issues  daughter - 3 yrs now - has enlarged tonsils looking for surgery  - working full time HHA   c/o upper back spasms - pain still ongoing on and off  -does wors at [facility] mighthave moved a patient pulled muscle -did warm compresses - took tylenol little help   seeing Gyn trying for second baby - on clomid - tried IVF 11/2022 unsuccessful - will do another later in few months -wants referral for endo -did 3 IVF so far no success  -taking 2 medications -clomid for twice daily 5 days and menopur 75 IU qd for 5 days , Gonalf 75 IU qd 5 days   HX pain in b/l hand due to lifting baby saw ortho dx Dequervains Tenosynovitis - pt deferred cortisone shot - wearing wrist brace - better now   DM - FBS- 110-115 -125- 140-145  -on metformin 1000 am 1000 at night  -no hypoglycemic episode   last ophtho 7/2023

## 2024-09-06 NOTE — ASSESSMENT
[FreeTextEntry1] : 42 yo F with recently diagnosed DMII (A1C 11.1% 7/2016 ) presented for f/u on ch issues   left upper back muscle spasm  -pt referral given  - advised to avoid pulling pushing , lifting , carrying weights , bending etc  - do warm compresses  - start Flexeril 5 mg qhs as needed - no driving  - side effects reviewed  - rtc if no improvement  Acne face saw derm 9/15/22 consult reviewed gave clinda and azelaic - not working trial of benzoly peroxide 5 % BID adv use sunscreen   rt hand bone Cyst palm - hand sp eval    GERD- better now  -flared up with coffee and tea - she stop caffine beverages -helped a lot  -Educated patient lifestyle modification, advice to avoid fried food, greasy oily and spicy foods, avoid tomato, orange, lemon , or caffeinated beverages. -Avoid reclining upto 3 hours after meals    DM -Aic- 6.5 12/2023--> 6.7 4/24 -->6.6 8/24  get AIC  - continue meds metformin 2 gm qd as per endo for tight glycemic control rx renewed today  - advised to increase exercise to 30 minutes daily , do resistance training 2 x a week 20 minutes  - Reminded patient to maintain healthy eating habits (avoid sweets, carbs) and to continue exercising as she has been, including honey  - Continue monitoring sugars -ophtho 11/9 /2023  no retinopathy  - F/u appointment in 3 -6 months -pneumovax 23 given 9/2019    Hyperlipidemia-LDL- 106 9/23  - self discontinued statins - refused medications  - low fat diet reviewed   PCOD- infertility - only one kid 3 yrs old - trying for IVF - failed 11/2022 unsuccessful - will do another later in few months -wants referral for endo -did 3 IVF so far no success  -taking 2 medications -clomid for twice daily 5 days and menopur 75 IU qd for 5 days , Gonalf 75 IU qd 5 days  -endo referral placed   dx Dequervains Tenosynovitis left wrist and Wrist pain with numbness rt hand--resolved   CTS- adviced wrist brace both hands - f/u hand specialist consider injection  -avoid lifting weights sp baby   h/o +PPD since 11/2016 rx  - finished rx [facility]/Pyridoxine - chest xray 9/24/21 clear   HCM  tdap - 7/10/2020  PAP- 10/21/2021 neg has appt 8/22/24  flu vaccine-9/15/22 -pt refused  pneumonia 23 vaccine 9/2019  covid vaccine- Pfizer 10/2/21 completed  Mammo - 1/19/24  Bi rad 2 reviewed

## 2024-09-17 NOTE — DISCHARGE NOTE OB - PRINCIPAL DIAGNOSIS
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 Patient denied any urological symptom  Please refer to URO Consult Note.
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 Patient denied any urological symptom  Please refer to URO Consult Note.
 (normal spontaneous vaginal delivery)

## 2024-10-02 ENCOUNTER — APPOINTMENT (OUTPATIENT)
Dept: INTERNAL MEDICINE | Facility: CLINIC | Age: 44
End: 2024-10-02
Payer: COMMERCIAL

## 2024-10-02 VITALS
DIASTOLIC BLOOD PRESSURE: 74 MMHG | TEMPERATURE: 98.4 F | BODY MASS INDEX: 27.66 KG/M2 | OXYGEN SATURATION: 98 % | SYSTOLIC BLOOD PRESSURE: 114 MMHG | HEIGHT: 65 IN | HEART RATE: 89 BPM | WEIGHT: 166 LBS

## 2024-10-02 DIAGNOSIS — R10.13 EPIGASTRIC PAIN: ICD-10-CM

## 2024-10-02 PROCEDURE — 99214 OFFICE O/P EST MOD 30 MIN: CPT

## 2024-10-02 RX ORDER — PANTOPRAZOLE 40 MG/1
40 TABLET, DELAYED RELEASE ORAL
Qty: 60 | Refills: 0 | Status: ACTIVE | COMMUNITY
Start: 2024-10-02 | End: 1900-01-01

## 2024-10-02 NOTE — HISTORY OF PRESENT ILLNESS
[FreeTextEntry8] : here for epigastric pain on and off x 2 weeks lasts for ~ 5 minutes each time  pain is achy / burning sensation- similar to that she has when she has GERD - has seen GI in the past - EGD 2 YRS AGO -  was told to take omperazole 40 as needed  she reports that the pain radiates to L  scapular area on the scapular spine , and when abd pain stops, the back pain stops too  she reports no N/V/Diarrhea  took naprosyn 1 month ago for  a different kind of  back pain  no exacerbating / relieving factors  no ETOH

## 2024-10-02 NOTE — ASSESSMENT
[FreeTextEntry1] : 1)  given similarity to previous GERD symptoms, GERD / gastritis is a possibility - will start Protonix 40 BID  - check H/H , FOBT  - amylase / lipase  - US abd  ED precautions discussed

## 2024-10-03 LAB
ALBUMIN SERPL ELPH-MCNC: 4.1 G/DL
ALP BLD-CCNC: 62 U/L
ALT SERPL-CCNC: 11 U/L
AMYLASE/CREAT SERPL: 63 U/L
ANION GAP SERPL CALC-SCNC: 14 MMOL/L
AST SERPL-CCNC: 14 U/L
BILIRUB SERPL-MCNC: 0.2 MG/DL
BUN SERPL-MCNC: 9 MG/DL
CALCIUM SERPL-MCNC: 9.4 MG/DL
CHLORIDE SERPL-SCNC: 104 MMOL/L
CO2 SERPL-SCNC: 23 MMOL/L
CREAT SERPL-MCNC: 0.63 MG/DL
EGFR: 114 ML/MIN/1.73M2
GLUCOSE SERPL-MCNC: 134 MG/DL
HCT VFR BLD CALC: 39.5 %
HGB BLD-MCNC: 13.1 G/DL
LPL SERPL-CCNC: 28 U/L
MCHC RBC-ENTMCNC: 29.4 PG
MCHC RBC-ENTMCNC: 33.2 GM/DL
MCV RBC AUTO: 88.6 FL
PLATELET # BLD AUTO: 322 K/UL
POTASSIUM SERPL-SCNC: 4.3 MMOL/L
PROT SERPL-MCNC: 6.6 G/DL
RBC # BLD: 4.46 M/UL
RBC # FLD: 14 %
SODIUM SERPL-SCNC: 140 MMOL/L
WBC # FLD AUTO: 9.72 K/UL

## 2024-10-08 LAB — HEMOCCULT STL QL IA: NEGATIVE

## 2024-10-11 ENCOUNTER — OUTPATIENT (OUTPATIENT)
Dept: OUTPATIENT SERVICES | Facility: HOSPITAL | Age: 44
LOS: 1 days | End: 2024-10-11
Payer: COMMERCIAL

## 2024-10-11 ENCOUNTER — APPOINTMENT (OUTPATIENT)
Dept: ULTRASOUND IMAGING | Facility: CLINIC | Age: 44
End: 2024-10-11
Payer: COMMERCIAL

## 2024-10-11 DIAGNOSIS — R10.13 EPIGASTRIC PAIN: ICD-10-CM

## 2024-10-11 PROCEDURE — 76700 US EXAM ABDOM COMPLETE: CPT

## 2024-10-11 PROCEDURE — 76700 US EXAM ABDOM COMPLETE: CPT | Mod: 26

## 2024-10-14 DIAGNOSIS — Z01.419 ENCOUNTER FOR GYNECOLOGICAL EXAMINATION (GENERAL) (ROUTINE) W/OUT ABNORMAL FINDINGS: ICD-10-CM

## 2024-10-17 ENCOUNTER — APPOINTMENT (OUTPATIENT)
Dept: OBGYN | Facility: CLINIC | Age: 44
End: 2024-10-17
Payer: COMMERCIAL

## 2024-10-17 VITALS
DIASTOLIC BLOOD PRESSURE: 80 MMHG | WEIGHT: 169 LBS | HEIGHT: 65 IN | SYSTOLIC BLOOD PRESSURE: 130 MMHG | BODY MASS INDEX: 28.16 KG/M2

## 2024-10-17 DIAGNOSIS — Z01.419 ENCOUNTER FOR GYNECOLOGICAL EXAMINATION (GENERAL) (ROUTINE) W/OUT ABNORMAL FINDINGS: ICD-10-CM

## 2024-10-17 DIAGNOSIS — N94.6 DYSMENORRHEA, UNSPECIFIED: ICD-10-CM

## 2024-10-17 DIAGNOSIS — E11.9 TYPE 2 DIABETES MELLITUS W/OUT COMPLICATIONS: ICD-10-CM

## 2024-10-17 PROCEDURE — 99396 PREV VISIT EST AGE 40-64: CPT

## 2024-10-17 PROCEDURE — 99214 OFFICE O/P EST MOD 30 MIN: CPT | Mod: 25

## 2024-10-17 PROCEDURE — 99459 PELVIC EXAMINATION: CPT

## 2024-10-23 LAB — CYTOLOGY CVX/VAG DOC THIN PREP: ABNORMAL

## 2024-11-21 ENCOUNTER — NON-APPOINTMENT (OUTPATIENT)
Age: 44
End: 2024-11-21

## 2024-11-21 ENCOUNTER — APPOINTMENT (OUTPATIENT)
Dept: OPHTHALMOLOGY | Facility: CLINIC | Age: 44
End: 2024-11-21
Payer: COMMERCIAL

## 2024-11-21 PROCEDURE — 92014 COMPRE OPH EXAM EST PT 1/>: CPT

## 2024-11-21 PROCEDURE — 92134 CPTRZ OPH DX IMG PST SGM RTA: CPT

## 2024-12-06 ENCOUNTER — APPOINTMENT (OUTPATIENT)
Dept: INTERNAL MEDICINE | Facility: CLINIC | Age: 44
End: 2024-12-06

## 2024-12-06 VITALS
HEIGHT: 65 IN | WEIGHT: 164 LBS | OXYGEN SATURATION: 98 % | HEART RATE: 79 BPM | DIASTOLIC BLOOD PRESSURE: 72 MMHG | SYSTOLIC BLOOD PRESSURE: 118 MMHG | BODY MASS INDEX: 27.32 KG/M2 | TEMPERATURE: 97.9 F

## 2024-12-06 DIAGNOSIS — Z87.39 PERSONAL HISTORY OF OTHER DISEASES OF THE MUSCULOSKELETAL SYSTEM AND CONNECTIVE TISSUE: ICD-10-CM

## 2024-12-06 DIAGNOSIS — Z00.00 ENCOUNTER FOR GENERAL ADULT MEDICAL EXAMINATION W/OUT ABNORMAL FINDINGS: ICD-10-CM

## 2024-12-06 DIAGNOSIS — Z87.898 PERSONAL HISTORY OF OTHER SPECIFIED CONDITIONS: ICD-10-CM

## 2024-12-06 PROCEDURE — 90677 PCV20 VACCINE IM: CPT

## 2024-12-06 PROCEDURE — 36415 COLL VENOUS BLD VENIPUNCTURE: CPT

## 2024-12-06 PROCEDURE — 90472 IMMUNIZATION ADMIN EACH ADD: CPT

## 2024-12-06 PROCEDURE — G0009: CPT

## 2024-12-06 PROCEDURE — 99396 PREV VISIT EST AGE 40-64: CPT | Mod: 25

## 2024-12-06 PROCEDURE — 90656 IIV3 VACC NO PRSV 0.5 ML IM: CPT

## 2024-12-09 LAB
ALBUMIN SERPL ELPH-MCNC: 4.3 G/DL
ALP BLD-CCNC: 70 U/L
ALT SERPL-CCNC: 10 U/L
ANION GAP SERPL CALC-SCNC: 17 MMOL/L
APPEARANCE: CLEAR
AST SERPL-CCNC: 10 U/L
BACTERIA: NEGATIVE /HPF
BILIRUB SERPL-MCNC: 0.3 MG/DL
BILIRUBIN URINE: NEGATIVE
BLOOD URINE: NEGATIVE
BUN SERPL-MCNC: 9 MG/DL
CALCIUM SERPL-MCNC: 9.6 MG/DL
CAST: 0 /LPF
CHLORIDE SERPL-SCNC: 101 MMOL/L
CHOLEST SERPL-MCNC: 223 MG/DL
CO2 SERPL-SCNC: 24 MMOL/L
COLOR: YELLOW
CREAT SERPL-MCNC: 0.65 MG/DL
CREAT SPEC-SCNC: 131 MG/DL
EGFR: 113 ML/MIN/1.73M2
EPITHELIAL CELLS: 0 /HPF
ESTIMATED AVERAGE GLUCOSE: 137 MG/DL
GLUCOSE QUALITATIVE U: NEGATIVE MG/DL
GLUCOSE SERPL-MCNC: 114 MG/DL
HBA1C MFR BLD HPLC: 6.4 %
HCT VFR BLD CALC: 43.3 %
HDLC SERPL-MCNC: 69 MG/DL
HGB BLD-MCNC: 13.6 G/DL
KETONES URINE: NEGATIVE MG/DL
LDLC SERPL CALC-MCNC: 136 MG/DL
LEUKOCYTE ESTERASE URINE: NEGATIVE
MCHC RBC-ENTMCNC: 28.5 PG
MCHC RBC-ENTMCNC: 31.4 G/DL
MCV RBC AUTO: 90.6 FL
MICROALBUMIN 24H UR DL<=1MG/L-MCNC: <1.2 MG/DL
MICROALBUMIN/CREAT 24H UR-RTO: NORMAL MG/G
MICROSCOPIC-UA: NORMAL
NITRITE URINE: NEGATIVE
NONHDLC SERPL-MCNC: 154 MG/DL
PH URINE: 5.5
PLATELET # BLD AUTO: 334 K/UL
POTASSIUM SERPL-SCNC: 4.3 MMOL/L
PROT SERPL-MCNC: 6.9 G/DL
PROTEIN URINE: NEGATIVE MG/DL
RBC # BLD: 4.78 M/UL
RBC # FLD: 13.5 %
RED BLOOD CELLS URINE: 1 /HPF
SODIUM SERPL-SCNC: 142 MMOL/L
SPECIFIC GRAVITY URINE: 1.02
TRIGL SERPL-MCNC: 103 MG/DL
TSH SERPL-ACNC: 1.14 UIU/ML
UROBILINOGEN URINE: 1 MG/DL
VIT B12 SERPL-MCNC: 495 PG/ML
WBC # FLD AUTO: 7.33 K/UL
WHITE BLOOD CELLS URINE: 0 /HPF

## 2025-01-10 ENCOUNTER — RESULT REVIEW (OUTPATIENT)
Age: 45
End: 2025-01-10

## 2025-01-10 ENCOUNTER — OUTPATIENT (OUTPATIENT)
Dept: OUTPATIENT SERVICES | Facility: HOSPITAL | Age: 45
LOS: 1 days | End: 2025-01-10
Payer: COMMERCIAL

## 2025-01-10 ENCOUNTER — APPOINTMENT (OUTPATIENT)
Dept: MAMMOGRAPHY | Facility: CLINIC | Age: 45
End: 2025-01-10
Payer: COMMERCIAL

## 2025-01-10 DIAGNOSIS — N63.0 UNSPECIFIED LUMP IN UNSPECIFIED BREAST: ICD-10-CM

## 2025-01-10 PROCEDURE — 77067 SCR MAMMO BI INCL CAD: CPT | Mod: 26

## 2025-01-10 PROCEDURE — 77067 SCR MAMMO BI INCL CAD: CPT

## 2025-01-10 PROCEDURE — 77063 BREAST TOMOSYNTHESIS BI: CPT | Mod: 26

## 2025-01-10 PROCEDURE — 77063 BREAST TOMOSYNTHESIS BI: CPT

## 2025-05-15 ENCOUNTER — APPOINTMENT (OUTPATIENT)
Dept: OBGYN | Facility: CLINIC | Age: 45
End: 2025-05-15
Payer: COMMERCIAL

## 2025-05-15 VITALS
WEIGHT: 179.13 LBS | HEIGHT: 65 IN | DIASTOLIC BLOOD PRESSURE: 80 MMHG | BODY MASS INDEX: 29.84 KG/M2 | SYSTOLIC BLOOD PRESSURE: 122 MMHG

## 2025-05-15 DIAGNOSIS — N92.1 EXCESSIVE AND FREQUENT MENSTRUATION WITH IRREGULAR CYCLE: ICD-10-CM

## 2025-05-15 PROCEDURE — 99214 OFFICE O/P EST MOD 30 MIN: CPT

## 2025-05-28 ENCOUNTER — OUTPATIENT (OUTPATIENT)
Dept: OUTPATIENT SERVICES | Facility: HOSPITAL | Age: 45
LOS: 1 days | End: 2025-05-28
Payer: COMMERCIAL

## 2025-05-28 ENCOUNTER — APPOINTMENT (OUTPATIENT)
Dept: ULTRASOUND IMAGING | Facility: CLINIC | Age: 45
End: 2025-05-28
Payer: COMMERCIAL

## 2025-05-28 DIAGNOSIS — N92.1 EXCESSIVE AND FREQUENT MENSTRUATION WITH IRREGULAR CYCLE: ICD-10-CM

## 2025-05-28 PROCEDURE — 76856 US EXAM PELVIC COMPLETE: CPT

## 2025-05-28 PROCEDURE — 76856 US EXAM PELVIC COMPLETE: CPT | Mod: 26

## 2025-06-10 ENCOUNTER — NON-APPOINTMENT (OUTPATIENT)
Age: 45
End: 2025-06-10

## 2025-06-12 ENCOUNTER — APPOINTMENT (OUTPATIENT)
Dept: INTERNAL MEDICINE | Facility: CLINIC | Age: 45
End: 2025-06-12
Payer: COMMERCIAL

## 2025-06-12 VITALS
HEIGHT: 64 IN | DIASTOLIC BLOOD PRESSURE: 70 MMHG | WEIGHT: 170 LBS | OXYGEN SATURATION: 98 % | BODY MASS INDEX: 29.02 KG/M2 | TEMPERATURE: 98.3 F | HEART RATE: 75 BPM | SYSTOLIC BLOOD PRESSURE: 115 MMHG

## 2025-06-12 PROBLEM — Z02.89 ENCOUNTER FOR COMPLETION OF FORM WITH PATIENT: Status: ACTIVE | Noted: 2025-06-12

## 2025-06-12 PROBLEM — M85.642 CYST OF BONE OF LEFT HAND: Status: ACTIVE | Noted: 2025-06-12

## 2025-06-12 LAB — HBA1C MFR BLD HPLC: 6.9

## 2025-06-12 PROCEDURE — 83036 HEMOGLOBIN GLYCOSYLATED A1C: CPT | Mod: QW

## 2025-06-12 PROCEDURE — 99214 OFFICE O/P EST MOD 30 MIN: CPT

## 2025-06-15 LAB — DRUG ABUSE PANEL-9, SERUM: NORMAL

## 2025-06-19 ENCOUNTER — APPOINTMENT (OUTPATIENT)
Dept: OBGYN | Facility: CLINIC | Age: 45
End: 2025-06-19
Payer: COMMERCIAL

## 2025-06-19 VITALS
WEIGHT: 173 LBS | HEIGHT: 64 IN | DIASTOLIC BLOOD PRESSURE: 76 MMHG | BODY MASS INDEX: 29.53 KG/M2 | SYSTOLIC BLOOD PRESSURE: 128 MMHG

## 2025-06-19 LAB
HCG UR QL: NEGATIVE
QUALITY CONTROL: YES

## 2025-06-19 PROCEDURE — 58558Z: CUSTOM

## 2025-06-19 PROCEDURE — 81025 URINE PREGNANCY TEST: CPT

## 2025-06-20 ENCOUNTER — APPOINTMENT (OUTPATIENT)
Dept: ORTHOPEDIC SURGERY | Facility: CLINIC | Age: 45
End: 2025-06-20

## 2025-06-20 PROBLEM — M67.432 GANGLION CYST OF DORSUM OF LEFT WRIST: Status: ACTIVE | Noted: 2025-06-20

## 2025-06-20 PROCEDURE — 20612 ASPIRATE/INJ GANGLION CYST: CPT | Mod: LT

## 2025-06-20 PROCEDURE — 99204 OFFICE O/P NEW MOD 45 MIN: CPT | Mod: 25

## 2025-06-24 LAB — CORE LAB BIOPSY: NORMAL

## 2025-07-17 ENCOUNTER — APPOINTMENT (OUTPATIENT)
Dept: OBGYN | Facility: CLINIC | Age: 45
End: 2025-07-17
Payer: COMMERCIAL

## 2025-07-17 VITALS
DIASTOLIC BLOOD PRESSURE: 80 MMHG | SYSTOLIC BLOOD PRESSURE: 110 MMHG | BODY MASS INDEX: 30.54 KG/M2 | HEIGHT: 64 IN | WEIGHT: 178.9 LBS

## 2025-07-17 PROBLEM — Z87.42 H/O MENORRHAGIA: Status: ACTIVE | Noted: 2025-06-19

## 2025-07-17 PROCEDURE — 99214 OFFICE O/P EST MOD 30 MIN: CPT

## 2025-09-09 ENCOUNTER — APPOINTMENT (OUTPATIENT)
Dept: INTERNAL MEDICINE | Facility: CLINIC | Age: 45
End: 2025-09-09
Payer: COMMERCIAL

## 2025-09-09 VITALS
WEIGHT: 173 LBS | BODY MASS INDEX: 29.53 KG/M2 | TEMPERATURE: 98 F | HEART RATE: 93 BPM | DIASTOLIC BLOOD PRESSURE: 75 MMHG | OXYGEN SATURATION: 98 % | SYSTOLIC BLOOD PRESSURE: 115 MMHG | HEIGHT: 64 IN

## 2025-09-09 DIAGNOSIS — L70.0 ACNE VULGARIS: ICD-10-CM

## 2025-09-09 DIAGNOSIS — E11.9 TYPE 2 DIABETES MELLITUS W/OUT COMPLICATIONS: ICD-10-CM

## 2025-09-09 DIAGNOSIS — M25.561 PAIN IN RIGHT KNEE: ICD-10-CM

## 2025-09-09 PROCEDURE — 83036 HEMOGLOBIN GLYCOSYLATED A1C: CPT | Mod: QW

## 2025-09-09 PROCEDURE — 99214 OFFICE O/P EST MOD 30 MIN: CPT

## 2025-09-09 RX ORDER — DICLOFENAC SODIUM 10 MG/G
1 GEL TOPICAL
Qty: 1 | Refills: 2 | Status: ACTIVE | COMMUNITY
Start: 2025-09-09 | End: 1900-01-01